# Patient Record
Sex: FEMALE | Race: WHITE | NOT HISPANIC OR LATINO | Employment: FULL TIME | URBAN - METROPOLITAN AREA
[De-identification: names, ages, dates, MRNs, and addresses within clinical notes are randomized per-mention and may not be internally consistent; named-entity substitution may affect disease eponyms.]

---

## 2017-01-10 ENCOUNTER — ALLSCRIPTS OFFICE VISIT (OUTPATIENT)
Dept: OTHER | Facility: OTHER | Age: 32
End: 2017-01-10

## 2017-04-03 ENCOUNTER — ALLSCRIPTS OFFICE VISIT (OUTPATIENT)
Dept: OTHER | Facility: OTHER | Age: 32
End: 2017-04-03

## 2017-04-05 ENCOUNTER — ALLSCRIPTS OFFICE VISIT (OUTPATIENT)
Dept: OTHER | Facility: OTHER | Age: 32
End: 2017-04-05

## 2017-04-11 ENCOUNTER — LAB CONVERSION - ENCOUNTER (OUTPATIENT)
Dept: OTHER | Facility: OTHER | Age: 32
End: 2017-04-11

## 2017-04-11 LAB
ADDITIONAL INFORMATION (HISTORICAL): NORMAL
ADEQUACY: (HISTORICAL): NORMAL
COMMENT (HISTORICAL): NORMAL
CYTOTECHNOLOGIST: (HISTORICAL): NORMAL
HPV MRNA E6/E7 (HISTORICAL): NOT DETECTED
INTERPRETATION (HISTORICAL): NORMAL
LMP (HISTORICAL): NORMAL
PREV. BX: (HISTORICAL): NORMAL
PREV. PAP (HISTORICAL): NORMAL
REVIEWED BY (HISTORICAL): NORMAL
SOURCE (HISTORICAL): NORMAL

## 2017-04-12 ENCOUNTER — LAB CONVERSION - ENCOUNTER (OUTPATIENT)
Dept: OTHER | Facility: OTHER | Age: 32
End: 2017-04-12

## 2017-05-12 DIAGNOSIS — E04.1 NONTOXIC SINGLE THYROID NODULE: ICD-10-CM
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
## 2017-05-16 ENCOUNTER — LAB CONVERSION - ENCOUNTER (OUTPATIENT)
Dept: OTHER | Facility: OTHER | Age: 32
End: 2017-05-16

## 2017-05-16 ENCOUNTER — GENERIC CONVERSION - ENCOUNTER (OUTPATIENT)
Dept: OTHER | Facility: OTHER | Age: 32
End: 2017-05-16

## 2017-05-16 LAB — LYME IGG/IGM AB (HISTORICAL): <0.9 INDEX

## 2017-05-18 ENCOUNTER — ALLSCRIPTS OFFICE VISIT (OUTPATIENT)
Dept: OTHER | Facility: OTHER | Age: 32
End: 2017-05-18

## 2017-05-19 ENCOUNTER — HOSPITAL ENCOUNTER (OUTPATIENT)
Dept: RADIOLOGY | Facility: HOSPITAL | Age: 32
Discharge: HOME | End: 2017-05-19
Payer: COMMERCIAL

## 2017-05-19 DIAGNOSIS — E04.1 NONTOXIC SINGLE THYROID NODULE: ICD-10-CM

## 2017-05-19 PROCEDURE — 76536 US EXAM OF HEAD AND NECK: CPT

## 2017-05-20 ENCOUNTER — GENERIC CONVERSION - ENCOUNTER (OUTPATIENT)
Dept: OTHER | Facility: OTHER | Age: 32
End: 2017-05-20

## 2017-05-23 LAB
T4 FREE SERPL-MCNC: 1.5 NG/DL
TSH SERPL DL<=0.05 MIU/L-ACNC: 2.08 M[IU]/L

## 2018-01-12 VITALS
HEART RATE: 84 BPM | WEIGHT: 131.2 LBS | DIASTOLIC BLOOD PRESSURE: 64 MMHG | BODY MASS INDEX: 25.76 KG/M2 | HEIGHT: 60 IN | RESPIRATION RATE: 16 BRPM | TEMPERATURE: 97.9 F | SYSTOLIC BLOOD PRESSURE: 110 MMHG

## 2018-01-13 VITALS
WEIGHT: 128.5 LBS | HEIGHT: 60 IN | RESPIRATION RATE: 18 BRPM | DIASTOLIC BLOOD PRESSURE: 68 MMHG | TEMPERATURE: 99.2 F | SYSTOLIC BLOOD PRESSURE: 102 MMHG | HEART RATE: 72 BPM | BODY MASS INDEX: 25.23 KG/M2

## 2018-01-13 VITALS
WEIGHT: 126.8 LBS | HEIGHT: 60 IN | RESPIRATION RATE: 16 BRPM | TEMPERATURE: 97.2 F | HEART RATE: 60 BPM | BODY MASS INDEX: 24.9 KG/M2 | DIASTOLIC BLOOD PRESSURE: 80 MMHG | SYSTOLIC BLOOD PRESSURE: 106 MMHG

## 2018-01-14 VITALS
HEIGHT: 60 IN | SYSTOLIC BLOOD PRESSURE: 122 MMHG | BODY MASS INDEX: 25.82 KG/M2 | WEIGHT: 131.5 LBS | DIASTOLIC BLOOD PRESSURE: 80 MMHG

## 2018-01-16 NOTE — MISCELLANEOUS
Message   Date: 17 Aug 2016 8:25 AM EST, Recorded By: Andreia Vazquez For: Dante Baxter   Caller: Finas Habermann, Self   Phone: [phone] (Home), ,   Reason: Medical Complaint   Patient called and stated she was only about 5wks pregnant  Last night she started spotting blood and feels like she is going to have her period  Her OBGYN does not open office till 1pm  I advised to call their on call physician and go to ER to be further evaluated  Patient agreed and understood  Active Problems    1  Anxiety (300 00) (F41 9)   2  Asthma (493 90) (J45 909)   3  Bicornate uterus (752 34) (Q51 3)   4  Dermatitis (692 9) (L30 9)   5  GERD (gastroesophageal reflux disease) (530 81) (K21 9)   6  History of allergy (V15 09) (Z88 9)   7  Irritable bowel syndrome (564 1) (K58 9)   8  Need for prophylactic vaccination and inoculation against influenza (V04 81) (Z23)   9  Pain in joint of right shoulder region (719 41) (M25 511)   10  Tear of acetabular labrum (843 8) (S73 199A)   11  Viral infection (079 99) (B34 9)    Current Meds   1  Azithromycin 250 MG Oral Tablet; TAKE 2 TABLETS ON DAY 1 THEN TAKE 1 TABLET A   DAY FOR 4 DAYS; Therapy: 11Wnn6816 to (Last Rx:13Mhr5005) Ordered   2  Estrostep Fe 1-20/1-30/1-35 MG-MCG Oral Tablet; TAKE 1 TABLET DAILY FOR 21 DAYS,   THEN 7 TABLET-FREE DAYS; REPEAT Recorded   3  Nasonex 50 MCG/ACT Nasal Suspension; USE 1 TO 2 SPRAYS IN EACH NOSTRIL   ONCE DAILY; Therapy: 65PCO1048 to (Evaluate:62Cjg2344)  Requested for: 24SYU7459; Last   Rx:37Gyz9588 Ordered   4  Pantoprazole Sodium 40 MG Oral Tablet Delayed Release; TAKE 1 TABLET BY MOUTH   DAILY 30 MINUTES BEFORE BREAKFAST AND REPEAT AT DINNER IF SYMPTOMS   RECUR; Therapy: 26VAN5295 to (Evaluate:21Jan2016)  Requested for: 54Jcn5876; Last   Rx:[id number] Ordered   5  Vitamin D 1000 UNIT CAPS; Take as directed Recorded   6  ZyrTEC Allergy TABS; Therapy: (Recorded:22Mar2013) to Recorded    Allergies    1   Flagyl CAPS   2  Flagyl TABS    Plan  Pregnancy    · (1) ABO/RH(D); Status:Active - Retrospective Authorization; Requested for:63Exm9845;    · (1) HCG QUANT; Status:Active - Retrospective Authorization; Requested for:82Ram4286;    · (1) HCG QUANT; Status:Active - Retrospective Authorization; Requested for:79Dyd4339;     Signatures   Electronically signed by : Chandrakant Manley, ; Aug 17 2016  8:27AM EST                       (Author)    Electronically signed by :  Saundra Ellsworth DO; Aug 17 2016  3:41PM EST                       (Author)

## 2018-01-17 NOTE — RESULT NOTES
Verified Results  (Q) LYME DISEASE AB, TOTAL W/REFL WB (IGG, IGM) [id number] 09:10AM Crystal Esther   REPORT COMMENT:  FASTING:NO     Test Name Result Flag Reference   LYME AB SCREEN <0 90 index     Index                Interpretation                     -----                --------------                     < 0 90               Negative                     0  90-1 09            Equivocal                     > 1 09               Positive      As recommended by the Food and Drug Administration   (FDA), all samples with positive or equivocal   results in a Borrelia burgdorferi antibody screen  will be tested using a blot method  Positive or   equivocal screening test results should not be   interpreted as truly positive until verified as such   using a supplemental assay (e g , B  burgdorferi blot)  The screening test and/or blot for B  burgdorferi   antibodies may be falsely negative in early stages  of Lyme disease, including the period when erythema   migrans is apparent

## 2018-01-25 ENCOUNTER — INITIAL PRENATAL (OUTPATIENT)
Dept: OBGYN CLINIC | Facility: CLINIC | Age: 33
End: 2018-01-25

## 2018-01-25 VITALS — SYSTOLIC BLOOD PRESSURE: 112 MMHG | DIASTOLIC BLOOD PRESSURE: 62 MMHG | WEIGHT: 125 LBS | BODY MASS INDEX: 24.41 KG/M2

## 2018-01-25 DIAGNOSIS — Z3A.10 10 WEEKS GESTATION OF PREGNANCY: ICD-10-CM

## 2018-01-25 DIAGNOSIS — Z36.9 ANTENATAL SCREENING ENCOUNTER: Primary | ICD-10-CM

## 2018-01-25 PROCEDURE — PNV: Performed by: OBSTETRICS & GYNECOLOGY

## 2018-01-25 RX ORDER — ESTRADIOL 2 MG/1
2 TABLET ORAL 2 TIMES DAILY
COMMUNITY
End: 2018-07-13 | Stop reason: ALTCHOICE

## 2018-01-25 RX ORDER — ASPIRIN 81 MG/1
TABLET, CHEWABLE ORAL
COMMUNITY
End: 2018-08-13 | Stop reason: HOSPADM

## 2018-01-25 RX ORDER — ALBUTEROL SULFATE 90 UG/1
AEROSOL, METERED RESPIRATORY (INHALATION)
COMMUNITY
Start: 2012-01-05 | End: 2018-06-05 | Stop reason: SDUPTHER

## 2018-01-25 RX ORDER — LEVOTHYROXINE SODIUM 0.07 MG/1
1 TABLET ORAL DAILY
COMMUNITY
End: 2018-04-06

## 2018-01-25 NOTE — PROGRESS NOTES
Assessment     Pregnancy at  10 and 1/7 weeks      Plan    Prenatal vitamins  AFP3 discussed: undecided  Role of ultrasound in pregnancy discussed; fetal survey: discussed  Amniocentesis discussed: not indicated  Follow up in 1 weeks  100% of 90 min visit spent on counseling and coordination of care  Subjective     Rosalba Koenig is being seen today for her first obstetrical visit  This is a planned pregnancy  She is at 10 1/7 gestation  Her obstetrical history is significant for infertility, IVF pregnancy, 2 cervi  Relationship with FOB: spouse, living together  Patient does intend to breast feed  Pregnancy history fully reviewed  Menstrual History:  OB History      Para Term  AB Living    4       3 0    SAB TAB Ectopic Multiple Live Births    3       0        Obstetric Comments    [facility]          The following portions of the patient's history were reviewed and updated as appropriate: allergies, current medications, past family history, past medical history, past social history and past surgical history  Review of Systems  Pertinent items are noted in HPI  Objective     No exam performed today, OB physical 18  Pt did three rounds of IVF to achieve this pregnancy  Pt seen by Dr Nely Reyes at [address]  Pt did complete carrier screening and cytogenetics prior to implantation  Pt is taking OTC PNV with DHA and folic acid  Initial labs ordered (quest) +1hr glucose (mother has DM I, and so was MGM)  Prenatal packet given and discussed, all questions and concerns addressed  Pt verbalized understanding to teaching

## 2018-01-29 ENCOUNTER — APPOINTMENT (OUTPATIENT)
Dept: LAB | Facility: HOSPITAL | Age: 33
End: 2018-01-29
Attending: OBSTETRICS & GYNECOLOGY
Payer: COMMERCIAL

## 2018-01-29 ENCOUNTER — TRANSCRIBE ORDERS (OUTPATIENT)
Dept: LAB | Facility: HOSPITAL | Age: 33
End: 2018-01-29

## 2018-01-29 DIAGNOSIS — O24.419 GESTATIONAL DIABETES MELLITUS (GDM) IN FIRST TRIMESTER, GESTATIONAL DIABETES METHOD OF CONTROL UNSPECIFIED: Primary | ICD-10-CM

## 2018-01-29 DIAGNOSIS — Z36.9 RESEARCH REQUESTED ANTENATAL ULTRASOUND SCAN: Primary | ICD-10-CM

## 2018-01-29 DIAGNOSIS — Z36.9 RESEARCH REQUESTED ANTENATAL ULTRASOUND SCAN: ICD-10-CM

## 2018-01-29 LAB
ABO GROUP BLD: NORMAL
BASOPHILS # BLD AUTO: 0.03 THOUSANDS/ΜL (ref 0–0.1)
BASOPHILS NFR BLD AUTO: 1 % (ref 0–1)
BILIRUB UR QL STRIP: NEGATIVE
BLD GP AB SCN SERPL QL: NEGATIVE
CLARITY UR: ABNORMAL
COLOR UR: YELLOW
EOSINOPHIL # BLD AUTO: 0.14 THOUSAND/ΜL (ref 0–0.61)
EOSINOPHIL NFR BLD AUTO: 2 % (ref 0–6)
ERYTHROCYTE [DISTWIDTH] IN BLOOD BY AUTOMATED COUNT: 11.9 % (ref 11.6–15.1)
GLUCOSE 1H P 50 G GLC PO SERPL-MCNC: 189 MG/DL
GLUCOSE UR STRIP-MCNC: ABNORMAL MG/DL
HCT VFR BLD AUTO: 35.5 % (ref 34.8–46.1)
HGB BLD-MCNC: 12.4 G/DL (ref 11.5–15.4)
HGB UR QL STRIP.AUTO: NEGATIVE
HIV 1+2 AB+HIV1 P24 AG SERPL QL IA: NORMAL
HIV1 P24 AG SER QL: NORMAL
KETONES UR STRIP-MCNC: NEGATIVE MG/DL
LEUKOCYTE ESTERASE UR QL STRIP: NEGATIVE
LYMPHOCYTES # BLD AUTO: 1.56 THOUSANDS/ΜL (ref 0.6–4.47)
LYMPHOCYTES NFR BLD AUTO: 25 % (ref 14–44)
MCH RBC QN AUTO: 31.6 PG (ref 26.8–34.3)
MCHC RBC AUTO-ENTMCNC: 34.9 G/DL (ref 31.4–37.4)
MCV RBC AUTO: 91 FL (ref 82–98)
MONOCYTES # BLD AUTO: 0.33 THOUSAND/ΜL (ref 0.17–1.22)
MONOCYTES NFR BLD AUTO: 5 % (ref 4–12)
NEUTROPHILS # BLD AUTO: 4.09 THOUSANDS/ΜL (ref 1.85–7.62)
NEUTS SEG NFR BLD AUTO: 67 % (ref 43–75)
NITRITE UR QL STRIP: NEGATIVE
NRBC BLD AUTO-RTO: 0 /100 WBCS
PH UR STRIP.AUTO: 6 [PH] (ref 4.5–8)
PLATELET # BLD AUTO: 280 THOUSANDS/UL (ref 149–390)
PMV BLD AUTO: 8.7 FL (ref 8.9–12.7)
PROT UR STRIP-MCNC: NEGATIVE MG/DL
RBC # BLD AUTO: 3.92 MILLION/UL (ref 3.81–5.12)
RH BLD: NEGATIVE
RPR SER QL: NORMAL
RUBV IGG SERPL IA-ACNC: 39.1 IU/ML
SP GR UR STRIP.AUTO: 1.03 (ref 1–1.03)
SPECIMEN EXPIRATION DATE: NORMAL
UROBILINOGEN UR QL STRIP.AUTO: 0.2 E.U./DL
WBC # BLD AUTO: 6.18 THOUSAND/UL (ref 4.31–10.16)

## 2018-01-29 PROCEDURE — 81003 URINALYSIS AUTO W/O SCOPE: CPT

## 2018-01-29 PROCEDURE — 86706 HEP B SURFACE ANTIBODY: CPT

## 2018-01-29 PROCEDURE — 82950 GLUCOSE TEST: CPT

## 2018-01-29 PROCEDURE — 36415 COLL VENOUS BLD VENIPUNCTURE: CPT

## 2018-01-29 PROCEDURE — 80081 OBSTETRIC PANEL INC HIV TSTG: CPT

## 2018-01-29 PROCEDURE — 87806 HIV AG W/HIV1&2 ANTB W/OPTIC: CPT

## 2018-01-30 ENCOUNTER — OFFICE VISIT (OUTPATIENT)
Dept: OBGYN CLINIC | Facility: CLINIC | Age: 33
End: 2018-01-30

## 2018-01-30 VITALS
SYSTOLIC BLOOD PRESSURE: 118 MMHG | BODY MASS INDEX: 24.46 KG/M2 | DIASTOLIC BLOOD PRESSURE: 78 MMHG | HEIGHT: 60 IN | WEIGHT: 124.6 LBS

## 2018-01-30 DIAGNOSIS — Z3A.10 10 WEEKS GESTATION OF PREGNANCY: ICD-10-CM

## 2018-01-30 DIAGNOSIS — Z36.9 ANTENATAL SCREENING ENCOUNTER: Primary | ICD-10-CM

## 2018-01-30 LAB
HBV SURFACE AB SER-ACNC: >1000 MIU/ML
HBV SURFACE AG SER QL: NORMAL

## 2018-01-30 PROCEDURE — PNV: Performed by: OBSTETRICS & GYNECOLOGY

## 2018-01-31 PROBLEM — Z87.718 HISTORY OF UTERINE ANOMALY: Status: ACTIVE | Noted: 2018-01-31

## 2018-01-31 LAB — HIV 1+2 AB+HIV1 P24 AG SERPL QL IA: NORMAL

## 2018-01-31 NOTE — PROGRESS NOTES
This is a 68-year-old white female she is a  4 para 0 she has a history of a complete uterine septum all the way down to the external cervical os  This was removed surgically  She has been seeing an infertility specialist at [address] [facility]  She conceived via IVF pregnancy, with frequent ultrasound dating given an estimated date of confinement of 2018  She is currently taking vitamin-D, Synthroid, for hypothyroidism  She is also taking a prenatal vitamin and baby aspirin  She has also been started on progesterone suppositories as per her infertility specialist she will stop that at approximately 13 weeks  Her blood type is Rh negative  Under chromosome study of the embryo is supposed to be 55 XX  Hepatitis screening test was negative  The patient also has a history of will double cervix  She also has a history of abnormal Pap 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  No treatment has been performed  Physical exam shows a well-developed well-nourished white female in no acute distress, her cardiac exam was normal with a normal S1-S2 with no murmur  Her lung exam normal limits breast exam normal limits is no masses  Her abdominal exam   is benign Pelvic exam the external genitalia normal limits the vagina is clean  Visualization of the cervix with a double cervix still  A Pap smear was performed with each the cervix     The patient is aware of resolved  She has a double cervix      Impression    Intrauterine pregnancy, size equals dates, [facility] 2018, history of a uterine septum that is removed surgically  IV of pregnancy  I suggests evaluation at [place] [address]  Will continue to follow her thyroid levels  She will continue the progesterone suppositories to approximately 13 weeks gestation  She is aware of increased risk for  section

## 2018-01-31 NOTE — PATIENT INSTRUCTIONS
Intrauterine pregnancy size equals dates IV of pregnancy history of hypothyroidism prior uterine surgery suggest Maternal-Fetal Medicine consultation

## 2018-02-05 LAB
C TRACH RRNA SPEC QL NAA+PROBE: NOT DETECTED
C TRACH RRNA SPEC QL NAA+PROBE: NOT DETECTED
CLINICAL INFO: NORMAL
CLINICAL INFO: NORMAL
CYTO CVX: NORMAL
CYTO CVX: NORMAL
CYTOLOGY CMNT CVX/VAG CYTO-IMP: NORMAL
CYTOLOGY CMNT CVX/VAG CYTO-IMP: NORMAL
DATE PREVIOUS BX: NORMAL
DATE PREVIOUS BX: NORMAL
HPV E6+E7 MRNA CVX QL NAA+PROBE: NOT DETECTED
HPV E6+E7 MRNA CVX QL NAA+PROBE: NOT DETECTED
LMP START DATE: NORMAL
LMP START DATE: NORMAL
N GONORRHOEA RRNA SPEC QL NAA+PROBE: NOT DETECTED
N GONORRHOEA RRNA SPEC QL NAA+PROBE: NOT DETECTED
SL AMB PREV. PAP:: NORMAL
SL AMB PREV. PAP:: NORMAL
SPECIMEN SOURCE CVX/VAG CYTO: NORMAL
SPECIMEN SOURCE CVX/VAG CYTO: NORMAL

## 2018-02-07 ENCOUNTER — ROUTINE PRENATAL (OUTPATIENT)
Dept: PERINATAL CARE | Facility: CLINIC | Age: 33
End: 2018-02-07
Payer: COMMERCIAL

## 2018-02-07 ENCOUNTER — OFFICE VISIT (OUTPATIENT)
Dept: PERINATAL CARE | Facility: CLINIC | Age: 33
End: 2018-02-07
Payer: COMMERCIAL

## 2018-02-07 ENCOUNTER — LAB (OUTPATIENT)
Dept: LAB | Facility: HOSPITAL | Age: 33
End: 2018-02-07
Payer: COMMERCIAL

## 2018-02-07 VITALS
HEART RATE: 80 BPM | WEIGHT: 124.2 LBS | DIASTOLIC BLOOD PRESSURE: 63 MMHG | HEIGHT: 60 IN | BODY MASS INDEX: 24.39 KG/M2 | SYSTOLIC BLOOD PRESSURE: 117 MMHG

## 2018-02-07 DIAGNOSIS — Z3A.12 12 WEEKS GESTATION OF PREGNANCY: ICD-10-CM

## 2018-02-07 DIAGNOSIS — O24.419 GESTATIONAL DIABETES MELLITUS (GDM) IN FIRST TRIMESTER, GESTATIONAL DIABETES METHOD OF CONTROL UNSPECIFIED: ICD-10-CM

## 2018-02-07 DIAGNOSIS — Q51.28 PREGNANCY WITH CONGENITAL DUPLICATION OF UTERUS IN FIRST TRIMESTER: ICD-10-CM

## 2018-02-07 DIAGNOSIS — E03.9 HYPOTHYROIDISM, UNSPECIFIED TYPE: ICD-10-CM

## 2018-02-07 DIAGNOSIS — O24.410 DIET CONTROLLED GESTATIONAL DIABETES MELLITUS (GDM) IN FIRST TRIMESTER: Primary | ICD-10-CM

## 2018-02-07 DIAGNOSIS — O24.419 GESTATIONAL DIABETES MELLITUS (GDM) IN FIRST TRIMESTER, GESTATIONAL DIABETES METHOD OF CONTROL UNSPECIFIED: Primary | ICD-10-CM

## 2018-02-07 DIAGNOSIS — O34.591 PREGNANCY WITH CONGENITAL DUPLICATION OF UTERUS IN FIRST TRIMESTER: ICD-10-CM

## 2018-02-07 DIAGNOSIS — O09.811 PREGNANCY RESULTING FROM IN VITRO FERTILIZATION IN FIRST TRIMESTER: ICD-10-CM

## 2018-02-07 DIAGNOSIS — Z36.82 ENCOUNTER FOR NUCHAL TRANSLUCENCY TESTING: Primary | ICD-10-CM

## 2018-02-07 LAB
ALBUMIN SERPL BCP-MCNC: 3.3 G/DL (ref 3.5–5)
ALP SERPL-CCNC: 39 U/L (ref 46–116)
ALT SERPL W P-5'-P-CCNC: 18 U/L (ref 12–78)
ANION GAP SERPL CALCULATED.3IONS-SCNC: 10 MMOL/L (ref 4–13)
AST SERPL W P-5'-P-CCNC: 12 U/L (ref 5–45)
BILIRUB SERPL-MCNC: 0.3 MG/DL (ref 0.2–1)
BUN SERPL-MCNC: 9 MG/DL (ref 5–25)
CALCIUM SERPL-MCNC: 8.7 MG/DL (ref 8.3–10.1)
CHLORIDE SERPL-SCNC: 102 MMOL/L (ref 100–108)
CO2 SERPL-SCNC: 27 MMOL/L (ref 21–32)
CREAT SERPL-MCNC: 0.47 MG/DL (ref 0.6–1.3)
EST. AVERAGE GLUCOSE BLD GHB EST-MCNC: 91 MG/DL
GFR SERPL CREATININE-BSD FRML MDRD: 131 ML/MIN/1.73SQ M
GLUCOSE P FAST SERPL-MCNC: 98 MG/DL (ref 65–99)
HBA1C MFR BLD: 4.8 % (ref 4.2–6.3)
POTASSIUM SERPL-SCNC: 3.9 MMOL/L (ref 3.5–5.3)
PROT SERPL-MCNC: 6.9 G/DL (ref 6.4–8.2)
SODIUM SERPL-SCNC: 139 MMOL/L (ref 136–145)
T4 FREE SERPL-MCNC: 1.29 NG/DL (ref 0.76–1.46)
TSH SERPL DL<=0.05 MIU/L-ACNC: 0.9 UIU/ML (ref 0.36–3.74)

## 2018-02-07 PROCEDURE — 99242 OFF/OP CONSLTJ NEW/EST SF 20: CPT | Performed by: OBSTETRICS & GYNECOLOGY

## 2018-02-07 PROCEDURE — 76801 OB US < 14 WKS SINGLE FETUS: CPT | Performed by: OBSTETRICS & GYNECOLOGY

## 2018-02-07 PROCEDURE — 84439 ASSAY OF FREE THYROXINE: CPT

## 2018-02-07 PROCEDURE — 83036 HEMOGLOBIN GLYCOSYLATED A1C: CPT

## 2018-02-07 PROCEDURE — 36415 COLL VENOUS BLD VENIPUNCTURE: CPT

## 2018-02-07 PROCEDURE — 76813 OB US NUCHAL MEAS 1 GEST: CPT | Performed by: OBSTETRICS & GYNECOLOGY

## 2018-02-07 PROCEDURE — 99215 OFFICE O/P EST HI 40 MIN: CPT | Performed by: NURSE PRACTITIONER

## 2018-02-07 PROCEDURE — 84443 ASSAY THYROID STIM HORMONE: CPT

## 2018-02-07 PROCEDURE — G0108 DIAB MANAGE TRN  PER INDIV: HCPCS | Performed by: DIETITIAN, REGISTERED

## 2018-02-07 PROCEDURE — 80053 COMPREHEN METABOLIC PANEL: CPT

## 2018-02-07 RX ORDER — LANCETS
EACH MISCELLANEOUS
Qty: 102 EACH | Refills: 2 | Status: SHIPPED | OUTPATIENT
Start: 2018-02-07 | End: 2018-05-11 | Stop reason: SDUPTHER

## 2018-02-07 NOTE — PROGRESS NOTES
I have reviewed the notes, assessments, and/or procedures performed by Christine Nettles, I concur with her/his documentation of Markel Phillips

## 2018-02-07 NOTE — PROGRESS NOTES
The patient was seen today for an ultrasound and consultation  Please see ultrasound report for details

## 2018-02-07 NOTE — PROGRESS NOTES
Assessment/Plan:   Diagnoses and all orders for this visit:    Gestational diabetes mellitus (GDM) in first trimester, gestational diabetes method of control unspecified  -     Ambulatory referral to Diabetic Education  -     Hemoglobin A1c; Future  -     Comprehensive metabolic panel; Future  -     TSH, 3rd generation; Future  -     T4, free; Future  -     glucose blood (ACCU-CHEK GUIDE) test strip; Use as instructed  -     ACCU-CHEK FASTCLIX LANCETS MISC; Use 4 a day    Hypothyroidism, unspecified type  -     Hemoglobin A1c; Future  -     Comprehensive metabolic panel; Future  -     TSH, 3rd generation; Future  -     T4, free; Future    12 weeks gestation of pregnancy  -     Hemoglobin A1c; Future  -     Comprehensive metabolic panel; Future  -     TSH, 3rd generation; Future  -     T4, free; Future  -     glucose blood (ACCU-CHEK GUIDE) test strip; Use as instructed  -     ACCU-CHEK FASTCLIX LANCETS MISC; Use 4 a day        Subjective:      Patient ID: Olivia Patterson is a 28 y o  female  She is a , IVF pregnancy with double uterus, followed by Raghu Barbosa Specialist  LMP in November, JACKIE 18, currently 12 0/7 weeks gestation  Patient referred here for gestational diabetes, 1 hour glucose test 189, reports checking her blood sugars 2 hours after meals, one episode of 190, readings mostly between 80 to 90  She has gluten intolerance  Has been following a diet of 3 meals with 2 snacks  Goes to yoga once a week  C/o thirst at night, denies increase in hunger or urination  [facility]- mother hast type 1 diabetes and hypothyroidism  Has Hypothyroidism, currently on Levothyroxine 75 mcg daily for about 1 year, no recent TSH/free T4  Complains of fatigue, nausea at night subsided, c/o cold feet, denies constipation or swelling        The following portions of the patient's history were reviewed and updated as appropriate: allergies, current medications, past family history, past medical history and problem list     Review of Systems   Constitutional: Positive for fatigue  Negative for appetite change  Respiratory: Negative for apnea, cough and wheezing  Cardiovascular: Negative for chest pain, palpitations and leg swelling  Gastrointestinal: Negative for constipation, nausea and vomiting  Endocrine: Positive for cold intolerance and polydipsia  Negative for heat intolerance, polyphagia and polyuria  Objective:     Physical Exam   Constitutional: She is oriented to person, place, and time  She appears well-developed and well-nourished  No distress  HENT:   Head: Normocephalic  Neck: Normal range of motion  Neck supple  No thyromegaly present  Cardiovascular: Normal rate, regular rhythm and normal heart sounds  No murmur heard  Pulmonary/Chest: No respiratory distress  She has no wheezes  Musculoskeletal: Normal range of motion  She exhibits no edema  Neurological: She is alert and oriented to person, place, and time  Skin: Skin is warm and dry  She is not diaphoretic  Psychiatric: She has a normal mood and affect   Her behavior is normal

## 2018-02-07 NOTE — PROGRESS NOTES
PREGNANCY DSMT/MNT     Assessment    Estimated Date of Delivery: 8/22/18   EGA: 12 0/7 weeks     HPI:    Patient presents to the office today for: Individual DSMT     Pregnancy is complicated by the following:  gestational diabetes     Barriers to Learning:  none     Height: 60 inches   Pre-Pregnancy weight: 122 pounds            BMI: 23 8   Current Weight:124 pounds                        Total Pregnancy Weight Gain: 2 pounds   One Hour Glucola: date completed 1/29/2018, 189 mg/dL            Medical Nutrition Therapy  1  Based on the patients height and pre-pregnancy weight, the recommended total weight gain for pregnancy is 25-35 pounds  2  The patient was provided with a 1500 calories for 1st trimester & 1700 calories for 2nd/3rd trimesters gestational diabetes meal plan  Stated she has a gluten intolerance & used many Gluten Free foods  3  Basic review of macronutrients  4  Consume three meals and three snacks daily  5  Carbohydrate gram limits per meal   6  Instructions on how to read a food label  Explained how to read food labels for CHO gm willy with Gluten Free foods  7  Appropriate serving sizes for carbohydrates and proteins  8  Incorporating protein with each meal and snack  9  Importance of protein in relationship to blood glucose control  10  Maintain a 3 day food log and  LIST VALUES (choose one) being to follow up appointment with the registered dietician  11  Reviewed patients food diary  12  Listeria guidelines  13  Mercury guidelines  Hypoglycemia Guidelines  Sick Day Guidelines  Exercise Guidelines  1  Exercise 1-2 hours follow a meal for approximately 20-30 minutes  2  Do not exercise until cleared by primary obstetrician  Travel Guidelines  Dining Out Guidelines  Breastfeeding Guidelines  Post Partum Guidelines:   1  Completion of a 2 hour, 75 gram glucose tolerance test at 6 weeks post partum to screen for type 2 diabetes  2  Screen for diabetes annually    3  20% weight loss and 30 minutes of exercise per week reduces the risk of type 2 diabetes  4  Screen for gestational diabetes in first trimester with any subsequent pregnancies  Maternal-Fetal Testing  1  Ultrasounds every 4 weeks at the [facility] office  2  Level II ultrasound at 20 weeks gestation  3  Fetal echocardiogram at 24 weeks for Perinatologist recommendation, if needed  4  Antepartum testing  beginning at 32 weeks gestation  twice weekly, if begins medication  5  HbA1c every 6 to 8 weeks  6  Comprehensive Metabolic Panel  Follow up  1  Contact the Diabetes and Pregnancy program with fingerstick blood glucose levels on Monday, 2/12/2018  Phone: [phone]  Fax: [phone]  Email: [email or web address]  2  Patient instructed to call [phone] if she does not receive a response within one business day  3  Follow up appointment to be scheduled as needed  Thank you for the opportunity to participate in the care of this patient  We can be reached at [phone] should you have any questions  Time spent with patient 10 to 11 AM; time spent counseling greater than 50% of the appointment

## 2018-02-07 NOTE — PATIENT INSTRUCTIONS
1  Start diet recommended by dietician with 3 meals and 3 snacks including protein  2  Check blood sugar in am, before meals, 2 hours after start of meal and at bedtime until Monday, 18 and send in glucose log   3  Fasting goals 60 to 90, 1 hour post meals 135 or less and 2 hour after meals 120 or less  4  Keep  center [address] scheduled  5  At 20 weeks you will need a Level II Us  6  Check A1c, CMP, TSH and free T4   7  Continue Levothyroxine 75 mcg daily in am, after review of TSH will assess need for adjustment  8  Continue baby aspirin and prenatal vitamin  9  Hypoglycemia and gestational diabetes reviewed, information given  10  Continue follow-up with OB and  center

## 2018-02-20 ENCOUNTER — TELEPHONE (OUTPATIENT)
Dept: PERINATAL CARE | Facility: CLINIC | Age: 33
End: 2018-02-20

## 2018-02-20 DIAGNOSIS — O34.591 PREGNANCY WITH CONGENITAL DUPLICATION OF UTERUS IN FIRST TRIMESTER: ICD-10-CM

## 2018-02-20 DIAGNOSIS — Q51.28 PREGNANCY WITH CONGENITAL DUPLICATION OF UTERUS IN FIRST TRIMESTER: ICD-10-CM

## 2018-02-23 ENCOUNTER — ROUTINE PRENATAL (OUTPATIENT)
Dept: OBGYN CLINIC | Facility: CLINIC | Age: 33
End: 2018-02-23

## 2018-02-23 VITALS — DIASTOLIC BLOOD PRESSURE: 60 MMHG | WEIGHT: 127.2 LBS | BODY MASS INDEX: 24.84 KG/M2 | SYSTOLIC BLOOD PRESSURE: 100 MMHG

## 2018-02-23 DIAGNOSIS — Z34.82 PRENATAL CARE, SUBSEQUENT PREGNANCY, SECOND TRIMESTER: Primary | ICD-10-CM

## 2018-02-23 PROCEDURE — PNV: Performed by: OBSTETRICS & GYNECOLOGY

## 2018-02-23 NOTE — PROGRESS NOTES
Patient is experiencing mild nausea and dull HA daily  She is using tylenol  She has dimmed the lights in her work place and is using a diffuser which helps  She completed the first part of the SQS testing on 2/27/18  Nuchal translucency is normal, nasal bone present  Follow up in 4 weeks

## 2018-03-13 ENCOUNTER — TELEPHONE (OUTPATIENT)
Dept: PERINATAL CARE | Facility: CLINIC | Age: 33
End: 2018-03-13

## 2018-03-13 NOTE — TELEPHONE ENCOUNTER
Date:  18  RE: Ronak Varela    : 1985  JACKIE: Estimated Date of Delivery: 18  EGA: 16w6d      Date Fasting Post-  breakfast Post-  lunch Post-  dinner Before bedtime Carbs Comments   3-7 81 88 103 108      3-8 83 96 73 103      3-9 92 98 116 113      3-10 91 87 95 112      3-11 86 81 97 109      3-12 86 98 99 107      3-13 98 101 99           Current regimen:  1500 calorie gestational diabetes meal plan with 3 meals/snacks  Self blood glucose monitoring fasting and 2 hours after meals  Plan:  Continue above regimen      Date due to report next:  Tuesday, 3-20-18    Don Alvarenga RN  Diabetes Educator   Diabetes and Pregnancy Program

## 2018-03-20 ENCOUNTER — DOCUMENTATION (OUTPATIENT)
Dept: PERINATAL CARE | Facility: CLINIC | Age: 33
End: 2018-03-20

## 2018-03-20 NOTE — PROGRESS NOTES
Date:  18  RE: Amanda Fraction    : 1985  JACKIE: Estimated Date of Delivery: 18  EGA: 17w6d      Date Fasting Post-  breakfast Post-  lunch Post-  dinner Before bedtime Carbs Comments   3/14 84 88 100 123      3/15 88 90 101 117      3/16 91 89 125 119      3/17 88 91 87 97      3/18 85 89 120 100      3/19 88 90 74 106      3/20 90 90 86           Current regimen:  1500 calorie gestational diabetes meal plan with 3 meals/snacks, bedtime snack protein only 2 to 3 oz  Self blood glucose monitoring fasting and 2 hours after meals  Plan:  Continue above regimen  Date due to report next:  Tuesday, 3-27-18 or sooner if blood sugars consistently out of goal range       MARIYA Garner  Diabetes and Pregnancy Program

## 2018-03-22 ENCOUNTER — TRANSCRIBE ORDERS (OUTPATIENT)
Dept: LAB | Facility: HOSPITAL | Age: 33
End: 2018-03-22

## 2018-03-22 ENCOUNTER — LAB (OUTPATIENT)
Dept: LAB | Facility: HOSPITAL | Age: 33
End: 2018-03-22
Attending: OBSTETRICS & GYNECOLOGY
Payer: COMMERCIAL

## 2018-03-22 ENCOUNTER — ROUTINE PRENATAL (OUTPATIENT)
Dept: OBGYN CLINIC | Facility: CLINIC | Age: 33
End: 2018-03-22

## 2018-03-22 VITALS — DIASTOLIC BLOOD PRESSURE: 62 MMHG | SYSTOLIC BLOOD PRESSURE: 104 MMHG | WEIGHT: 128 LBS | BODY MASS INDEX: 25 KG/M2

## 2018-03-22 DIAGNOSIS — Z34.90 PREGNANCY, UNSPECIFIED GESTATIONAL AGE: Primary | ICD-10-CM

## 2018-03-22 DIAGNOSIS — Z34.82 PRENATAL CARE, SUBSEQUENT PREGNANCY, SECOND TRIMESTER: Primary | ICD-10-CM

## 2018-03-22 DIAGNOSIS — Z34.90 PREGNANCY, UNSPECIFIED GESTATIONAL AGE: ICD-10-CM

## 2018-03-22 PROCEDURE — PNV: Performed by: NURSE PRACTITIONER

## 2018-03-22 PROCEDURE — 36415 COLL VENOUS BLD VENIPUNCTURE: CPT

## 2018-03-22 NOTE — PROGRESS NOTES
Patient is doing well  Denies LOF/Bleeding/Cramping  FBS in the AM are in the 80's  Patient is scheduled for level 2 US 4/6/18  Completed 2nd part of sequential screening this morning  All questions and concerns addressed and patient verbalized understanding  Patient to return in 4 weeks

## 2018-03-23 LAB — SCAN RESULT: NORMAL

## 2018-03-26 ENCOUNTER — APPOINTMENT (OUTPATIENT)
Dept: LAB | Facility: HOSPITAL | Age: 33
End: 2018-03-26
Attending: OBSTETRICS & GYNECOLOGY
Payer: COMMERCIAL

## 2018-03-26 ENCOUNTER — TELEPHONE (OUTPATIENT)
Dept: OBGYN CLINIC | Facility: CLINIC | Age: 33
End: 2018-03-26

## 2018-03-26 ENCOUNTER — TRANSCRIBE ORDERS (OUTPATIENT)
Dept: LAB | Facility: HOSPITAL | Age: 33
End: 2018-03-26

## 2018-03-26 DIAGNOSIS — N30.00 ACUTE CYSTITIS WITHOUT HEMATURIA: ICD-10-CM

## 2018-03-26 DIAGNOSIS — N30.00 ACUTE CYSTITIS WITHOUT HEMATURIA: Primary | ICD-10-CM

## 2018-03-26 LAB
BACTERIA UR QL AUTO: ABNORMAL /HPF
BILIRUB UR QL STRIP: NEGATIVE
CLARITY UR: ABNORMAL
COLOR UR: YELLOW
GLUCOSE UR STRIP-MCNC: NEGATIVE MG/DL
HGB UR QL STRIP.AUTO: NEGATIVE
HYALINE CASTS #/AREA URNS LPF: ABNORMAL /LPF
KETONES UR STRIP-MCNC: NEGATIVE MG/DL
LEUKOCYTE ESTERASE UR QL STRIP: ABNORMAL
NITRITE UR QL STRIP: NEGATIVE
NON-SQ EPI CELLS URNS QL MICRO: ABNORMAL /HPF
PH UR STRIP.AUTO: 7 [PH] (ref 4.5–8)
PROT UR STRIP-MCNC: NEGATIVE MG/DL
RBC #/AREA URNS AUTO: ABNORMAL /HPF
SP GR UR STRIP.AUTO: 1.01 (ref 1–1.03)
UROBILINOGEN UR QL STRIP.AUTO: 0.2 E.U./DL
WBC #/AREA URNS AUTO: ABNORMAL /HPF

## 2018-03-26 PROCEDURE — 81001 URINALYSIS AUTO W/SCOPE: CPT

## 2018-03-26 NOTE — TELEPHONE ENCOUNTER
Pt is 18 5/7 wk pregnant - having sl dysuria, urgency, afebrile  Hx UTI but not x 1 yr  Will have U/A, C&S done this aft ([facility])  Will recall with results  To increase fluid intake

## 2018-03-27 NOTE — TELEPHONE ENCOUNTER
Pt sx unchanged - u/a shows occas bacteria, trace leukocytes - will await urine C&S results/JSW & will recall pt

## 2018-03-28 ENCOUNTER — TELEPHONE (OUTPATIENT)
Dept: PERINATAL CARE | Facility: CLINIC | Age: 33
End: 2018-03-28

## 2018-03-28 DIAGNOSIS — O24.410 DIET CONTROLLED GESTATIONAL DIABETES MELLITUS (GDM) IN SECOND TRIMESTER: Primary | ICD-10-CM

## 2018-03-28 DIAGNOSIS — N39.0 URINARY TRACT INFECTION WITHOUT HEMATURIA, SITE UNSPECIFIED: Primary | ICD-10-CM

## 2018-03-28 RX ORDER — CEPHALEXIN 500 MG/1
500 CAPSULE ORAL 2 TIMES DAILY
Qty: 10 CAPSULE | Refills: 0 | Status: SHIPPED | OUTPATIENT
Start: 2018-03-28 | End: 2018-04-02

## 2018-03-28 NOTE — TELEPHONE ENCOUNTER
Date:  18  RE: Veronique Guardian    : 1985  JACKIE: Estimated Date of Delivery: 18  EGA: 19w0d    Diet controlled gestational diabetes  Date Fasting Post-  breakfast Post-  lunch Post-  dinner Before bedtime Carbs Comments   3/21/18 82 91 104 109      3/22/18 87 91 99 126      3/23/18 87 91 86 85      3/24/18 92 112 92 123      3/25/18 79 86 96 115      3/26/18 88 82 96 106      3/27/18 91 95 86 126          Current regimen:  1500 calorie gestational diabetes meal plan with 3 meals/snacks, bedtime snack protein only 2 to 3 oz  Self blood glucose monitoring fasting and 2 hours after meals  Plan:  Increase to 1700 calorie diet with 3 meals & 3 snacks & continue 2-3 oz protein only (no carbohydrate foods) at bedtime snack  Continue BG monitoring  Emailed her a prescription for HbA1c blood test     Date due to report next:  Tuesday, 4-3-18 or sooner if blood sugars consistently out of goal range       Jelani Valdivia  Diabetes and Pregnancy Program

## 2018-03-28 NOTE — TELEPHONE ENCOUNTER
Pt  With same sx - lab did not do urine C&S - will tx with keflex 500 mg bid x 5 days/JSW - pt informed    [facility] [facility]

## 2018-04-04 ENCOUNTER — TELEPHONE (OUTPATIENT)
Dept: OBGYN CLINIC | Facility: CLINIC | Age: 33
End: 2018-04-04

## 2018-04-04 ENCOUNTER — TELEPHONE (OUTPATIENT)
Dept: PERINATAL CARE | Facility: CLINIC | Age: 33
End: 2018-04-04

## 2018-04-04 DIAGNOSIS — R35.0 URINARY FREQUENCY: Primary | ICD-10-CM

## 2018-04-04 NOTE — TELEPHONE ENCOUNTER
Pt is 20 wk pregnant - tx with Keflex for urinary sx - had U/A done prior to tx but lab did not do urine C&S - completed 4/2/18  She is still having some urinary urgency & discomfort/soreness in clitoral area

## 2018-04-04 NOTE — TELEPHONE ENCOUNTER
Date:  18  RE: Emory Julien    : 1985  JACKIE: Estimated Date of Delivery: 18  EGA: 20w0d    Diet controlled gestational diabetes  Glucose Meter: Accu-Chek Guide     Date Fasting Post-  breakfast Post-  lunch Post-  dinner Before bedtime Carbs Comments   3/28/18 86 82 113 129      3/29/18 90 88 116 104      3/30/18 80 78 128 112      3/31/18 88 94 87 108      18 95 98 No lunch 135      18 91 96 115 135      4/3/18 85 103 101 120          Current regimen:  1700 calorie gestational diabetes meal plan with 3 meals/snacks, bedtime snack protein only 2 to 3 oz  Self blood glucose monitoring fasting and 2 hours after meals  Plan:  Schedule Insulin Instruction soon  Continue diet with  2-3 oz protein only (no carbohydrate foods) at bedtime snack  Continue BG monitoring  Encouraged her to complete HbA1c blood test     Date due to report next: At insulin education appointment       Rafael Meek  Diabetes and Pregnancy Program

## 2018-04-05 NOTE — TELEPHONE ENCOUNTER
Left message for patient to return call  I have placed an order for a urine culture  I would like to see her in the office to examine her

## 2018-04-06 ENCOUNTER — ROUTINE PRENATAL (OUTPATIENT)
Dept: OBGYN CLINIC | Facility: CLINIC | Age: 33
End: 2018-04-06

## 2018-04-06 ENCOUNTER — OFFICE VISIT (OUTPATIENT)
Dept: PERINATAL CARE | Facility: CLINIC | Age: 33
End: 2018-04-06
Payer: COMMERCIAL

## 2018-04-06 ENCOUNTER — TELEPHONE (OUTPATIENT)
Dept: PERINATAL CARE | Facility: CLINIC | Age: 33
End: 2018-04-06

## 2018-04-06 ENCOUNTER — ROUTINE PRENATAL (OUTPATIENT)
Dept: PERINATAL CARE | Facility: CLINIC | Age: 33
End: 2018-04-06
Payer: COMMERCIAL

## 2018-04-06 ENCOUNTER — APPOINTMENT (OUTPATIENT)
Dept: LAB | Facility: HOSPITAL | Age: 33
End: 2018-04-06
Payer: COMMERCIAL

## 2018-04-06 VITALS — DIASTOLIC BLOOD PRESSURE: 78 MMHG | SYSTOLIC BLOOD PRESSURE: 120 MMHG | WEIGHT: 130 LBS | BODY MASS INDEX: 25.39 KG/M2

## 2018-04-06 VITALS
WEIGHT: 130.6 LBS | SYSTOLIC BLOOD PRESSURE: 109 MMHG | DIASTOLIC BLOOD PRESSURE: 64 MMHG | HEIGHT: 60 IN | HEART RATE: 89 BPM | BODY MASS INDEX: 25.64 KG/M2

## 2018-04-06 VITALS
WEIGHT: 130 LBS | HEIGHT: 60 IN | SYSTOLIC BLOOD PRESSURE: 109 MMHG | BODY MASS INDEX: 25.52 KG/M2 | HEART RATE: 89 BPM | DIASTOLIC BLOOD PRESSURE: 64 MMHG

## 2018-04-06 DIAGNOSIS — O24.414 INSULIN CONTROLLED GESTATIONAL DIABETES MELLITUS (GDM) IN SECOND TRIMESTER: ICD-10-CM

## 2018-04-06 DIAGNOSIS — Z36.86 ENCOUNTER FOR ANTENATAL SCREENING FOR CERVICAL LENGTH: ICD-10-CM

## 2018-04-06 DIAGNOSIS — E03.9 HYPOTHYROIDISM AFFECTING PREGNANCY IN SECOND TRIMESTER: ICD-10-CM

## 2018-04-06 DIAGNOSIS — O99.282 HYPOTHYROIDISM AFFECTING PREGNANCY IN SECOND TRIMESTER: ICD-10-CM

## 2018-04-06 DIAGNOSIS — Q51.28 PREGNANCY WITH CONGENITAL DUPLICATION OF UTERUS IN FIRST TRIMESTER: ICD-10-CM

## 2018-04-06 DIAGNOSIS — O99.282 HYPOTHYROIDISM AFFECTING PREGNANCY IN SECOND TRIMESTER: Primary | ICD-10-CM

## 2018-04-06 DIAGNOSIS — O24.414 INSULIN CONTROLLED GESTATIONAL DIABETES MELLITUS (GDM) IN SECOND TRIMESTER: Primary | ICD-10-CM

## 2018-04-06 DIAGNOSIS — O34.591 PREGNANCY WITH CONGENITAL DUPLICATION OF UTERUS IN FIRST TRIMESTER: ICD-10-CM

## 2018-04-06 DIAGNOSIS — O09.812 PREGNANCY RESULTING FROM IN VITRO FERTILIZATION IN SECOND TRIMESTER: ICD-10-CM

## 2018-04-06 DIAGNOSIS — Z3A.20 20 WEEKS GESTATION OF PREGNANCY: ICD-10-CM

## 2018-04-06 DIAGNOSIS — O44.02 PLACENTA PREVIA IN SECOND TRIMESTER: ICD-10-CM

## 2018-04-06 DIAGNOSIS — E03.9 HYPOTHYROIDISM, UNSPECIFIED TYPE: ICD-10-CM

## 2018-04-06 DIAGNOSIS — E03.9 HYPOTHYROIDISM AFFECTING PREGNANCY IN SECOND TRIMESTER: Primary | ICD-10-CM

## 2018-04-06 DIAGNOSIS — Z34.82 PRENATAL CARE, SUBSEQUENT PREGNANCY, SECOND TRIMESTER: Primary | ICD-10-CM

## 2018-04-06 LAB
ALBUMIN SERPL BCP-MCNC: 3.2 G/DL (ref 3.5–5)
ALP SERPL-CCNC: 44 U/L (ref 46–116)
ALT SERPL W P-5'-P-CCNC: 18 U/L (ref 12–78)
ANION GAP SERPL CALCULATED.3IONS-SCNC: 7 MMOL/L (ref 4–13)
AST SERPL W P-5'-P-CCNC: 15 U/L (ref 5–45)
BILIRUB SERPL-MCNC: 0.2 MG/DL (ref 0.2–1)
BUN SERPL-MCNC: 11 MG/DL (ref 5–25)
CALCIUM SERPL-MCNC: 9.4 MG/DL (ref 8.3–10.1)
CHLORIDE SERPL-SCNC: 107 MMOL/L (ref 100–108)
CO2 SERPL-SCNC: 27 MMOL/L (ref 21–32)
CREAT SERPL-MCNC: 0.45 MG/DL (ref 0.6–1.3)
EST. AVERAGE GLUCOSE BLD GHB EST-MCNC: 94 MG/DL
GFR SERPL CREATININE-BSD FRML MDRD: 133 ML/MIN/1.73SQ M
GLUCOSE SERPL-MCNC: 85 MG/DL (ref 65–140)
HBA1C MFR BLD: 4.9 % (ref 4.2–6.3)
POTASSIUM SERPL-SCNC: 3.9 MMOL/L (ref 3.5–5.3)
PROT SERPL-MCNC: 6.9 G/DL (ref 6.4–8.2)
SODIUM SERPL-SCNC: 141 MMOL/L (ref 136–145)
T4 FREE SERPL-MCNC: 1.09 NG/DL (ref 0.76–1.46)
TSH SERPL DL<=0.05 MIU/L-ACNC: 2 UIU/ML (ref 0.36–3.74)

## 2018-04-06 PROCEDURE — 84443 ASSAY THYROID STIM HORMONE: CPT

## 2018-04-06 PROCEDURE — 76811 OB US DETAILED SNGL FETUS: CPT | Performed by: OBSTETRICS & GYNECOLOGY

## 2018-04-06 PROCEDURE — 80053 COMPREHEN METABOLIC PANEL: CPT

## 2018-04-06 PROCEDURE — PNV: Performed by: NURSE PRACTITIONER

## 2018-04-06 PROCEDURE — 76817 TRANSVAGINAL US OBSTETRIC: CPT | Performed by: OBSTETRICS & GYNECOLOGY

## 2018-04-06 PROCEDURE — 87086 URINE CULTURE/COLONY COUNT: CPT | Performed by: NURSE PRACTITIONER

## 2018-04-06 PROCEDURE — 99212 OFFICE O/P EST SF 10 MIN: CPT | Performed by: OBSTETRICS & GYNECOLOGY

## 2018-04-06 PROCEDURE — 84439 ASSAY OF FREE THYROXINE: CPT

## 2018-04-06 PROCEDURE — G0108 DIAB MANAGE TRN  PER INDIV: HCPCS

## 2018-04-06 PROCEDURE — 83036 HEMOGLOBIN GLYCOSYLATED A1C: CPT | Performed by: DIETITIAN, REGISTERED

## 2018-04-06 PROCEDURE — 36415 COLL VENOUS BLD VENIPUNCTURE: CPT | Performed by: DIETITIAN, REGISTERED

## 2018-04-06 RX ORDER — LEVOTHYROXINE SODIUM 0.1 MG/1
100 TABLET ORAL DAILY
Qty: 30 TABLET | Refills: 2 | Status: SHIPPED | OUTPATIENT
Start: 2018-04-06 | End: 2018-07-08 | Stop reason: SDUPTHER

## 2018-04-06 NOTE — PROGRESS NOTES
Present vaginal pain, pressure, urinary urgency for 2 weeks  Small amt of discharge  Treated with Keflex for suspected UTI  Still experiencing same symptoms  Speculum exam reveals copious amt of white milky discharge with no odor  MDL cultured obtained  I have ordered a urine culture since it was not done with the last urinalysis  We will call her with culture results  2nd trimester TSH was ordered by  center  She is to return as scheduled on 18

## 2018-04-06 NOTE — TELEPHONE ENCOUNTER
Date:  18  RE: Ricky De Luna    : 1985  JACKIE: 18  EGA: Garima Esteves    Insulin controlled gestational diabetes in second trimester  Glucose Meter: Accu-Chek Guide     Date Fasting Post-  breakfast Post-  lunch Post-  dinner Before bedtime Carbs Comments   4-4 95 103 91 128      4-5 91 110 102 120      4-6 90                                                     Current regimen:  1700 calorie gestational diabetes meal plan with 3 meals/snacks, bedtime snack protein only 2 to 3 oz  Self blood glucose monitoring fasting and 2 hours after meals  Plan:  Start Levemir- 10 units at 9 am and 10 units at 9 pm  Case discussed with MARIYA Crowe  Continue diet and instructed patient to return to bedtime snack of carbohydrate and protein foods  Advised patient to not skip meals or snacks  Continue BG monitoring and requested patient check a 3 am blood glucose  Encouraged her to complete HbA1c, CMP, TSH with free T4 blood test     Date due to report next:  Monday, 18      Yaima Walsh RD,LDN,CDE  Diabetes and Pregnancy Program

## 2018-04-06 NOTE — LETTER
April 7, 2018     Halie Ashford MD  [address]    Patient: Ricky De Luna   YOB: 1985   Date of Visit: 4/6/2018       Dear Dr Saldana Stager: Thank you for referring Ricky De Luna to me for evaluation  Below are my notes for this consultation  If you have questions, please do not hesitate to call me  I look forward to following your patient along with you  Sincerely,        Myriam Connolly MD        CC: No Recipients  Myriam Connolly MD  4/6/2018  9:10 AM  Sign at close encounter  Please refer to the [facility] ultrasound report in Ob Procedures for additional information regarding the visit to the [facility]  today

## 2018-04-06 NOTE — PROGRESS NOTES
Please refer to the [facility] ultrasound report in Ob Procedures for additional information regarding the visit to the [facility]  today

## 2018-04-06 NOTE — PROGRESS NOTES
DATE: 18   RE: Kodi Nielsen   : 1985  JACKIE: 18  EGA:  20w2d    Dear Dr Ricarda Fox,     Your patient was seen in the office today for insulin teaching  Please refer to Diabetes and Pregnancy Progress Record for complete documentation of patients blood glucose levels  Height: 5' (1 524 m)   Weight: 59 kg (130 lb)    The patient was instructed on the following:     Levemir Pen use  Initiate 10 units at 9 am and  10 units at 9 pm    Insulin administration times, insulin action   Hypoglycemia signs, symptoms and treatment   Increase in maternal-fetal surveillance with insulin initiation   Side effects of hyperglycemia in pregnancy including macrosomia,  hypoglycemia, polyhydramnios, pre-term labor and stillbirth   Continue to monitor blood glucoses via fingerstick fasting (goal 60 mg/dl to 90 mg/dl) and two hours post prandial (goal less than 120 mg/dl)  Also requested patient check a 3 am blood glucose   Follow up with our office on Monday, 18 for evaluation of blood glucose levels   Non-stress testing two times weekly and MERARI testing beginning at 32 weeks gestation   Ultrasounds every 4 weeks at the [address]   HbA1c every 6 to 8 weeks, CMP and a TSH with free T4 was ordered  Thank you for the opportunity to participate in the care of this patient  I can be reached at [phone] should you have any questions  Time spent with patient 30 minutes; time spent face to face counseling greater than 50% of the appointment      Sincerely,     Mustapha Patton, RD,LDN,CDE  Diabetes Educator  Diabetes and Pregnancy Program

## 2018-04-07 PROBLEM — O09.812 PREGNANCY RESULTING FROM IN VITRO FERTILIZATION IN SECOND TRIMESTER: Status: ACTIVE | Noted: 2018-04-07

## 2018-04-07 PROBLEM — O44.02 PLACENTA PREVIA IN SECOND TRIMESTER: Status: ACTIVE | Noted: 2018-04-07

## 2018-04-07 LAB — BACTERIA UR CULT: NORMAL

## 2018-04-09 ENCOUNTER — TELEPHONE (OUTPATIENT)
Dept: PERINATAL CARE | Facility: CLINIC | Age: 33
End: 2018-04-09

## 2018-04-09 NOTE — TELEPHONE ENCOUNTER
Date:  18  RE: Jimena Patterson    : 1985  JACKIE: Estimated Date of Delivery: 18  EGA: Teodoro Contreras  ObGyn: leonidas      Date Fasting Post-  breakfast Post-  lunch Post-  dinner Before bedtime Carbs Comments  0300 BG    4-6 90 107 103 135      4-7 77 85 101 108   95   4-8 73 94 99 101   80    4-9 76 92     74                           Current regimen:  Levemir-10 units at 0900     10 units at 2100  1700 calorie gestational diabetes diet with 3 meals/snacks  Self blood glucose monitoring fasting and 2 hours after meals with Accu-chek Guide meter        Plan:  Continue current regimen      Date due to report next:  Thursday, 18    Juventino Asencio RN  Diabetes Educator   [facility]regnancy [facility]

## 2018-04-10 ENCOUNTER — TELEPHONE (OUTPATIENT)
Dept: OBGYN CLINIC | Facility: CLINIC | Age: 33
End: 2018-04-10

## 2018-04-10 DIAGNOSIS — N76.0 BACTERIAL VAGINOSIS: Primary | ICD-10-CM

## 2018-04-10 DIAGNOSIS — B96.89 BACTERIAL VAGINOSIS: Primary | ICD-10-CM

## 2018-04-10 RX ORDER — CLINDAMYCIN PHOSPHATE 20 MG/G
1 CREAM VAGINAL
Qty: 40 G | Refills: 0 | Status: SHIPPED | OUTPATIENT
Start: 2018-04-10 | End: 2018-04-17

## 2018-04-13 ENCOUNTER — TELEPHONE (OUTPATIENT)
Dept: PERINATAL CARE | Facility: CLINIC | Age: 33
End: 2018-04-13

## 2018-04-13 NOTE — TELEPHONE ENCOUNTER
Date:  18  RE: April Ramires    : 1985  JACKIE: Estimated Date of Delivery: 18  EGA: Charity Barrera  ObGyn: Elisa Abdi    Insulin controlled gestational diabetes    Date Fasting Post-  breakfast Post-  lunch Post-  dinner Before bedtime Carbs Comments  0300 BG    18 76 92 100 118      4/10/18 78 91 96 111      18 76 101 90 128      18 78 85 102 131                              Current regimen:  Levemir-10 units at 0900     10 units at 2100  1700 calorie gestational diabetes diet with 3 meals/snacks  Self blood glucose monitoring fasting and 2 hours after meals with Accu-chek Guide meter        Plan:  Levemir--Increase to 11 units at 0900                 10 units at 2100  Continue diet & BG monitoring       Date due to report next:  Tuesday, 18    Roxanne Dalal  Diabetes Educator   [facility]regnancy [facility]

## 2018-04-18 ENCOUNTER — TELEPHONE (OUTPATIENT)
Dept: PERINATAL CARE | Facility: CLINIC | Age: 33
End: 2018-04-18

## 2018-04-18 NOTE — TELEPHONE ENCOUNTER
Date:  18  RE: Wan Lua    : 1985  Estimated Date of Delivery: 18  EGA: 22w0d  OB/GYN: Katie Molina      Date Fasting Post-  breakfast Post-  lunch Post-  dinner Before bedtime Carbs Comments   4-13 79 89 95 141      4-14 76 95 96 104      4-15 68 97 115 125      4-16 80 83 105 115      4-17 77 96 110 171+   += Minh chicas                 Current regimen:  Levemir-11 units at 0900                10 units at 2100  1700 calorie gestational diabetes diet with 3 meals/snacks  Self blood glucose monitoring fasting and 2 hours after meals with Accu-chek Guide meter       Plan:  Levemir- increase to 13 units at 0900      continue 10 units at 2100    Date due to report next:  Monday, 18    Sincerely,   Grazyna Beckham RN  Diabetes Educator   Di[facility]regnancy [facility]  The diabetic educator note was reviewed by me  I agree with her note

## 2018-04-19 ENCOUNTER — TELEPHONE (OUTPATIENT)
Dept: OBGYN CLINIC | Facility: CLINIC | Age: 33
End: 2018-04-19

## 2018-04-19 ENCOUNTER — ROUTINE PRENATAL (OUTPATIENT)
Dept: PERINATAL CARE | Facility: CLINIC | Age: 33
End: 2018-04-19
Payer: COMMERCIAL

## 2018-04-19 VITALS
HEART RATE: 86 BPM | HEIGHT: 60 IN | SYSTOLIC BLOOD PRESSURE: 113 MMHG | DIASTOLIC BLOOD PRESSURE: 66 MMHG | WEIGHT: 134 LBS | BODY MASS INDEX: 26.31 KG/M2

## 2018-04-19 DIAGNOSIS — O09.812 PREGNANCY RESULTING FROM IN VITRO FERTILIZATION IN SECOND TRIMESTER: ICD-10-CM

## 2018-04-19 DIAGNOSIS — Z3A.22 22 WEEKS GESTATION OF PREGNANCY: ICD-10-CM

## 2018-04-19 DIAGNOSIS — O24.414 INSULIN CONTROLLED GESTATIONAL DIABETES MELLITUS (GDM) IN SECOND TRIMESTER: Primary | ICD-10-CM

## 2018-04-19 PROBLEM — Z3A.12 12 WEEKS GESTATION OF PREGNANCY: Status: RESOLVED | Noted: 2018-02-07 | Resolved: 2018-04-19

## 2018-04-19 PROBLEM — O24.419 GESTATIONAL DIABETES MELLITUS (GDM) IN FIRST TRIMESTER: Status: RESOLVED | Noted: 2018-02-07 | Resolved: 2018-04-19

## 2018-04-19 PROCEDURE — 76827 ECHO EXAM OF FETAL HEART: CPT | Performed by: OBSTETRICS & GYNECOLOGY

## 2018-04-19 PROCEDURE — 93325 DOPPLER ECHO COLOR FLOW MAPG: CPT | Performed by: OBSTETRICS & GYNECOLOGY

## 2018-04-19 PROCEDURE — 76825 ECHO EXAM OF FETAL HEART: CPT | Performed by: OBSTETRICS & GYNECOLOGY

## 2018-04-19 NOTE — PROGRESS NOTES
Please refer to the [facility] ultrasound report in Ob Procedures for additional information regarding the visit to the [facility]  today

## 2018-04-19 NOTE — LETTER
April 19, 2018     Halie Ashford MD  [facility] [address]    Patient: Ricky De Luna   YOB: 1985   Date of Visit: 4/19/2018       Dear Dr Saldana Stager: Thank you for referring Ricky De Luna to me for evaluation  Below are my notes for this consultation  If you have questions, please do not hesitate to call me  I look forward to following your patient along with you  Sincerely,        Myriam Connolly MD        CC: No Recipients  Myriam Connolly MD  4/19/2018 10:36 AM  Signed  Please refer to the [facility] ultrasound report in Ob Procedures for additional information regarding the visit to the [facility]  today

## 2018-04-19 NOTE — TELEPHONE ENCOUNTER
Spoke with patient  She denies vaginal discharge  She does feel an internal vaginal fullness/pressure  It is better than what it was before since the treatment with Flagyl  She is scheduled to be seen on Monday 4/23  She knows to call with worsening or new symptoms

## 2018-04-19 NOTE — TELEPHONE ENCOUNTER
Pt wants to ask about symptoms still having  Thinks might still have same as before, but not sure    Please call [phone]

## 2018-04-23 ENCOUNTER — ROUTINE PRENATAL (OUTPATIENT)
Dept: OBGYN CLINIC | Facility: CLINIC | Age: 33
End: 2018-04-23

## 2018-04-23 VITALS — WEIGHT: 134.2 LBS | DIASTOLIC BLOOD PRESSURE: 62 MMHG | BODY MASS INDEX: 26.21 KG/M2 | SYSTOLIC BLOOD PRESSURE: 100 MMHG

## 2018-04-23 DIAGNOSIS — Z34.82 PRENATAL CARE, SUBSEQUENT PREGNANCY, SECOND TRIMESTER: Primary | ICD-10-CM

## 2018-04-23 PROCEDURE — PNV: Performed by: OBSTETRICS & GYNECOLOGY

## 2018-04-23 NOTE — PROGRESS NOTES
Patient is doing well  Denies LOF/Bleeding/Cramping  Baby is moving  18 - Fetal echocardiogram - normal  Patient is schedule at 28 weeks with  center for growth scan and placenta previa  Return in 4 weeks

## 2018-04-25 ENCOUNTER — TELEPHONE (OUTPATIENT)
Dept: PERINATAL CARE | Facility: CLINIC | Age: 33
End: 2018-04-25

## 2018-04-25 NOTE — TELEPHONE ENCOUNTER
Date:  18  RE: Meg Mims    : 1985  Estimated Date of Delivery: 18  EGA: 23w0d  OB/GYN: Kylah Pradhan    Insulin controlled gestational diabetes and Hypothyroidism  Date Fasting Post-  breakfast Post-  lunch Post-  dinner Before bedtime Carbs Comments   18 85 93 93 112      4/191/8 74 97 96 117      18 80 111 110 112      18 70 91 128 105      18 81 110 80 120      18 78 102 110 105      18 82 82 113 110        Current regimen:  Levemir-13 units at 0900                10 units at 2100  1700 calorie gestational diabetes diet with 3 meals/snacks  Self blood glucose monitoring fasting and 2 hours after meals with Accu-chek Guide meter       Plan:  Continue above regimen  18 A1c 4 9%, TSH 2 0, free T4 1 09, on Levothyroxine 100 mcg daily      Date due to report next:  18    Eve Eli  Diabetes Educator   Diabetes [facility] Pregnancy Program

## 2018-05-01 DIAGNOSIS — O24.419 GESTATIONAL DIABETES MELLITUS (GDM) IN FIRST TRIMESTER, GESTATIONAL DIABETES METHOD OF CONTROL UNSPECIFIED: ICD-10-CM

## 2018-05-01 DIAGNOSIS — Z3A.12 12 WEEKS GESTATION OF PREGNANCY: ICD-10-CM

## 2018-05-01 RX ORDER — BLOOD SUGAR DIAGNOSTIC
STRIP MISCELLANEOUS
Qty: 100 EACH | Refills: 3 | Status: SHIPPED | OUTPATIENT
Start: 2018-05-01 | End: 2018-08-13 | Stop reason: HOSPADM

## 2018-05-03 ENCOUNTER — TELEPHONE (OUTPATIENT)
Dept: PERINATAL CARE | Facility: CLINIC | Age: 33
End: 2018-05-03

## 2018-05-03 NOTE — TELEPHONE ENCOUNTER
Date:  18  RE: Jhonny Mehta    : 1985  Estimated Date of Delivery: 18  EGA: 24w1d  OB/GYN: Nora Schultz    Insulin controlled GDM and Hypothyroidism  Date Fasting Post-  breakfast Post-  lunch Post-  dinner Before bedtime Carbs Comments   - 82 79 122 107      4- 75 110 100 130      4-27 76 79 84 120      - 81 100 91 119      4- 95 122 121 115      4-30 80 100 104 143      5-1 74 83 79 117          Current regimen:  Levemir-13 units at 0900                10 units at 2100  1700 calorie gestational diabetes diet with 3 meals/snacks including protein  Self monitoring blood glucose (SMBG)  fasting and 2 hours after meals with Accu-chek Guide meter  Plan:  Levemir- increase from 13 to 15 units at 0900      Continue 10 units at 2100  Continue SMBG and diet  18 A1C 4 9%, TSH 2 0, free T4 1 09  Hypothyroidism, on  Levothyroxine increased from 75 to 100 mcg daily, due for repeat TSH/free T4       Date due to report next:  18    Iesha Madison RN  Diabetes Educator   Diabetes [facility] Pregnancy Program

## 2018-05-10 ENCOUNTER — TELEPHONE (OUTPATIENT)
Dept: PERINATAL CARE | Facility: CLINIC | Age: 33
End: 2018-05-10

## 2018-05-10 NOTE — TELEPHONE ENCOUNTER
Date:  05/10/18  RE: April Ramires    : 1985  Estimated Date of Delivery: 18  EGA: 25w1d  OB/GYN: Elisa Abdi    Insulin controlled GDM and Hypothyroidism  Date Fasting Post-  breakfast Post-  lunch Post-  dinner Before bedtime Carbs Comments   5-2 74 104 115 141      5-3 86 88 96 122      5-4 73 86 106 126      5-5 82 96 77 110      5-6 81 104 106 144      5-7 75 79 101 121      5-8 80 97 95 112          Current regimen:  Levemir-15 units at 0900                10 units at 2100  1700 calorie gestational diabetes diet with 3 meals/snacks including protein  Self monitoring blood glucose (SMBG)  fasting and 2 hours after meals with Accu-chek Guide meter  Plan:  Levemir- increase from 15 to 17 units at 0900      Continue 10 units at 2100  Patient may benefit from Metformin 500 mg at dinner meal  Will advise patient and await her decision regarding starting Metformin  Continue SMBG and diet  18 A1C 4 9%, TSH 2 0, free T4 1 09  Hypothyroidism, on  Levothyroxine increased from 75 to 100 mcg daily, due for repeat TSH/free T4       Date due to report next:  Tuesday, 5-15-18    Steven Noland, RD,LDN,CDE  Diabetes Educator   Diabetes [facility] Pregnancy Program

## 2018-05-11 ENCOUNTER — TELEPHONE (OUTPATIENT)
Dept: PERINATAL CARE | Facility: CLINIC | Age: 33
End: 2018-05-11

## 2018-05-11 DIAGNOSIS — O24.419 GESTATIONAL DIABETES MELLITUS (GDM) IN FIRST TRIMESTER, GESTATIONAL DIABETES METHOD OF CONTROL UNSPECIFIED: ICD-10-CM

## 2018-05-11 DIAGNOSIS — Z3A.12 12 WEEKS GESTATION OF PREGNANCY: ICD-10-CM

## 2018-05-11 RX ORDER — LANCETS
EACH MISCELLANEOUS
Qty: 102 EACH | Refills: 3 | Status: CANCELLED | OUTPATIENT
Start: 2018-05-11 | End: 2018-08-22

## 2018-05-11 RX ORDER — LANCETS
EACH MISCELLANEOUS
Qty: 102 EACH | Refills: 3 | Status: SHIPPED | OUTPATIENT
Start: 2018-05-11

## 2018-05-11 RX ORDER — LANCETS
EACH MISCELLANEOUS
Qty: 102 EACH | Refills: 1 | Status: SHIPPED | OUTPATIENT
Start: 2018-05-11 | End: 2018-05-11 | Stop reason: SDUPTHER

## 2018-05-16 ENCOUNTER — TELEPHONE (OUTPATIENT)
Dept: PERINATAL CARE | Facility: CLINIC | Age: 33
End: 2018-05-16

## 2018-05-16 DIAGNOSIS — Q51.28 PREGNANCY WITH CONGENITAL DUPLICATION OF UTERUS IN FIRST TRIMESTER: ICD-10-CM

## 2018-05-16 DIAGNOSIS — O34.591 PREGNANCY WITH CONGENITAL DUPLICATION OF UTERUS IN FIRST TRIMESTER: ICD-10-CM

## 2018-05-16 NOTE — TELEPHONE ENCOUNTER
Note      Date:  18  RE: Destiny Chanel    : 1985  Estimated Date of Delivery: 18  EGA: 26w0d  OB/GYN: Lindsey Leyva     Insulin controlled GDM and Hypothyroidism         Date Fasting Post-  breakfast Post-  lunch Post-  dinner Before bedtime Carbs Comments   18 83 104 107 140         5/10/18 75 84 101 98         18 83 89 107 104         18 85 94 96 114         18 74 90 93 110         18 75 102 104 121         5/15/18 77 119 94 130               Current regimen:  Levemir-17 units at 0930                10 units at 2100  1700 calorie gestational diabetes diet with 3 meals/snacks including protein  Self monitoring blood glucose (SMBG)  fasting and 2 hours after meals with Accu-chek Guide meter  Not interested in Metformin at this time       Plan:  Levemir- increase from 17 to 20 units at 0930                 Continue 10 units at 2130  Continue SMBG, switch to 1 hour after meals  Continue diet  18 A1C 4 9%, TSH 2 0, free T4 1 09  Hypothyroidism, on  Levothyroxine increased from 75 to 100 mcg daily, due for repeat TSH/free T4, informed to have labs done, order in system     Verbal instructions given via phone       Date due to report next:  Monday, 18    Transcribed by:  Jennifer Gonzales blood sugar login via [address]  Diabetes Educator   Diabetes [facility] Pregnancy Program

## 2018-05-21 ENCOUNTER — TELEPHONE (OUTPATIENT)
Dept: PERINATAL CARE | Facility: CLINIC | Age: 33
End: 2018-05-21

## 2018-05-21 ENCOUNTER — APPOINTMENT (OUTPATIENT)
Dept: LAB | Facility: HOSPITAL | Age: 33
End: 2018-05-21
Payer: COMMERCIAL

## 2018-05-21 ENCOUNTER — ROUTINE PRENATAL (OUTPATIENT)
Dept: OBGYN CLINIC | Facility: CLINIC | Age: 33
End: 2018-05-21

## 2018-05-21 VITALS — DIASTOLIC BLOOD PRESSURE: 76 MMHG | BODY MASS INDEX: 26.95 KG/M2 | SYSTOLIC BLOOD PRESSURE: 120 MMHG | WEIGHT: 138 LBS

## 2018-05-21 DIAGNOSIS — Z34.82 PRENATAL CARE, SUBSEQUENT PREGNANCY, SECOND TRIMESTER: Primary | ICD-10-CM

## 2018-05-21 DIAGNOSIS — O24.414 INSULIN CONTROLLED GESTATIONAL DIABETES MELLITUS (GDM) IN SECOND TRIMESTER: ICD-10-CM

## 2018-05-21 DIAGNOSIS — E03.9 HYPOTHYROIDISM AFFECTING PREGNANCY IN SECOND TRIMESTER: ICD-10-CM

## 2018-05-21 DIAGNOSIS — O99.282 HYPOTHYROIDISM AFFECTING PREGNANCY IN SECOND TRIMESTER: ICD-10-CM

## 2018-05-21 LAB
BASOPHILS # BLD AUTO: 0.03 THOUSANDS/ΜL (ref 0–0.1)
BASOPHILS NFR BLD AUTO: 0 % (ref 0–1)
BASOPHILS NFR MAR MANUAL: 0 % (ref 0–1)
EOSINOPHIL # BLD AUTO: 0.08 THOUSAND/ΜL (ref 0–0.61)
EOSINOPHIL NFR BLD AUTO: 1 % (ref 0–6)
EOSINOPHIL NFR BLD MANUAL: 0 % (ref 0–6)
ERYTHROCYTE [DISTWIDTH] IN BLOOD BY AUTOMATED COUNT: 12.4 % (ref 11.6–15.1)
HCT VFR BLD AUTO: 36 % (ref 34.8–46.1)
HGB BLD-MCNC: 12 G/DL (ref 11.5–15.4)
LYMPHOCYTES # BLD AUTO: 2.11 THOUSANDS/ΜL (ref 0.6–4.47)
LYMPHOCYTES # BLD AUTO: 2.59 THOUSAND/UL (ref 0.6–4.47)
LYMPHOCYTES # BLD AUTO: 26 % (ref 14–44)
LYMPHOCYTES NFR BLD AUTO: 21 % (ref 14–44)
MCH RBC QN AUTO: 31.5 PG (ref 26.8–34.3)
MCHC RBC AUTO-ENTMCNC: 33.3 G/DL (ref 31.4–37.4)
MCV RBC AUTO: 95 FL (ref 82–98)
METAMYELOCYTES NFR BLD MANUAL: 1 % (ref 0–1)
MONOCYTES # BLD AUTO: 0.65 THOUSAND/ΜL (ref 0.17–1.22)
MONOCYTES # BLD AUTO: 6.98 THOUSAND/UL (ref 0–1.22)
MONOCYTES NFR BLD AUTO: 7 % (ref 4–12)
MONOCYTES NFR BLD: 7 % (ref 4–12)
MYELOCYTES NFR BLD MANUAL: 2 % (ref 0–1)
NEUTROPHILS # BLD AUTO: 6.82 THOUSANDS/ΜL (ref 1.85–7.62)
NEUTROPHILS # BLD MANUAL: 6.68 THOUSAND/UL (ref 1.85–7.62)
NEUTS BAND NFR BLD MANUAL: 2 % (ref 0–8)
NEUTS SEG NFR BLD AUTO: 62 % (ref 43–75)
NEUTS SEG NFR BLD AUTO: 68 % (ref 43–75)
NRBC BLD AUTO-RTO: 0 /100 WBCS
PLATELET # BLD AUTO: 247 THOUSANDS/UL (ref 149–390)
PLATELET BLD QL SMEAR: ADEQUATE
PMV BLD AUTO: 8.2 FL (ref 8.9–12.7)
RBC # BLD AUTO: 3.81 MILLION/UL (ref 3.81–5.12)
T4 FREE SERPL-MCNC: 1.25 NG/DL (ref 0.76–1.46)
TOTAL CELLS COUNTED SPEC: 100
TSH SERPL DL<=0.05 MIU/L-ACNC: 0.77 UIU/ML (ref 0.36–3.74)
WBC # BLD AUTO: 9.97 THOUSAND/UL (ref 4.31–10.16)

## 2018-05-21 PROCEDURE — 86592 SYPHILIS TEST NON-TREP QUAL: CPT | Performed by: OBSTETRICS & GYNECOLOGY

## 2018-05-21 PROCEDURE — 36415 COLL VENOUS BLD VENIPUNCTURE: CPT | Performed by: OBSTETRICS & GYNECOLOGY

## 2018-05-21 PROCEDURE — 85007 BL SMEAR W/DIFF WBC COUNT: CPT | Performed by: OBSTETRICS & GYNECOLOGY

## 2018-05-21 PROCEDURE — 85025 COMPLETE CBC W/AUTO DIFF WBC: CPT | Performed by: OBSTETRICS & GYNECOLOGY

## 2018-05-21 PROCEDURE — PNV: Performed by: OBSTETRICS & GYNECOLOGY

## 2018-05-21 PROCEDURE — 84443 ASSAY THYROID STIM HORMONE: CPT

## 2018-05-21 PROCEDURE — 84439 ASSAY OF FREE THYROXINE: CPT

## 2018-05-21 PROCEDURE — 85027 COMPLETE CBC AUTOMATED: CPT | Performed by: OBSTETRICS & GYNECOLOGY

## 2018-05-21 NOTE — TELEPHONE ENCOUNTER
Date:  18  RE: John Vallejo    : 1985  Estimated Date of Delivery: 18  EGA: 26w5d  OB/GYN: Karma Diego      Date Fasting Post-  breakfast Post-  lunch Post-  dinner Before bedtime Carbs Comments   5-16 76 101 107 122      5-17 80 95 118 155      5-18 84 100 127 139      5-19 81 135 128 114      5-20 85 140 109 130      5-21 82 101                      Current regimen:  Levemir-20 units at 0930                10 units at 2100  1700 calorie gestational diabetes diet with 3 meals/snacks including protein  Self monitoring blood glucose (SMBG)  fasting and 1 hour after meals with Accu-chek Guide meter  Not interested in Metformin at this time  Plan:  Levemir- increase to 26 units at 0930      continue 10 units at 2100   continue diet and SMBG      Date due to report next:  Tuesday, 18    Philipp Mcnulty, RN  Diabetes Educator   Diabetes [facility] Pregnancy Program

## 2018-05-21 NOTE — PROGRESS NOTES
Patient is doing well  Denies LOF/Bleeding/Cramping  She complains of leg cramps with long periods and at bedtime  Patient advised she can use two TUMs daily and Magnesium OTC daily to help with the leg cramps  Baby is moving well     She is to return in 2 weeks for nurse visit for her Rhogam    She is to return in 4 weeks for routine prenatal exam

## 2018-05-22 LAB — RPR SER QL: NORMAL

## 2018-05-30 ENCOUNTER — ROUTINE PRENATAL (OUTPATIENT)
Dept: OBGYN CLINIC | Facility: CLINIC | Age: 33
End: 2018-05-30
Payer: COMMERCIAL

## 2018-05-30 ENCOUNTER — DOCUMENTATION (OUTPATIENT)
Dept: PERINATAL CARE | Facility: CLINIC | Age: 33
End: 2018-05-30

## 2018-05-30 VITALS
SYSTOLIC BLOOD PRESSURE: 110 MMHG | WEIGHT: 141.1 LBS | DIASTOLIC BLOOD PRESSURE: 60 MMHG | HEIGHT: 60 IN | BODY MASS INDEX: 27.7 KG/M2

## 2018-05-30 DIAGNOSIS — E03.9 HYPOTHYROIDISM, UNSPECIFIED TYPE: ICD-10-CM

## 2018-05-30 DIAGNOSIS — Q51.28 PREGNANCY WITH CONGENITAL DUPLICATION OF UTERUS IN FIRST TRIMESTER: ICD-10-CM

## 2018-05-30 DIAGNOSIS — O26.899 RH NEGATIVE STATE IN ANTEPARTUM PERIOD: ICD-10-CM

## 2018-05-30 DIAGNOSIS — O34.591 PREGNANCY WITH CONGENITAL DUPLICATION OF UTERUS IN FIRST TRIMESTER: ICD-10-CM

## 2018-05-30 DIAGNOSIS — Z29.13 NEED FOR RHOGAM DUE TO RH NEGATIVE MOTHER: Primary | ICD-10-CM

## 2018-05-30 DIAGNOSIS — Z67.91 RH NEGATIVE STATE IN ANTEPARTUM PERIOD: ICD-10-CM

## 2018-05-30 PROCEDURE — 96372 THER/PROPH/DIAG INJ SC/IM: CPT | Performed by: NURSE PRACTITIONER

## 2018-05-30 NOTE — PROGRESS NOTES
Note      Date:  18  RE: Brynn López    : 1985  Estimated Date of Delivery: 18  EGA: 28w0d  OB/GYN: Kait Wilcox     Insulin controlled GDM and Hypothyroidism         Date Fasting Post-  breakfast Post-  lunch Post-  dinner Comments    80 175 127 130  Cereal/banana    85 127 105 150      75 135 115 113      72 115 108 127      79 102 116 140      78 109 115 138      77 103 113 101           Current regimen:  Levemir-26 units at 0930                10 units at 2130  1700 calorie gestational diabetes diet with 3 meals/snacks including protein  Self monitoring blood glucose (SMBG)  fasting and 1 hour after meals with Accu-chek Guide meter  Not interested in Metformin at this time       Plan:  Levemir- continue 26 units at 0930                 continue 10 units at 2130  Continue SMBG and diet  18 A1C 4 9%, TSH 0 771, free T4 1 25  Hypothyroidism on 100 mcg daily  Repeat TSH, free T4 and A1c on 18  Schedule follow-up with dietician     Has fetal growth US scheduled for 18       Date due to report next:  18    MARIYA Wooten  Diabetes Educator   Diabetes [facility] Pregnancy Program

## 2018-05-31 ENCOUNTER — ULTRASOUND (OUTPATIENT)
Dept: PERINATAL CARE | Facility: CLINIC | Age: 33
End: 2018-05-31
Payer: COMMERCIAL

## 2018-05-31 VITALS
HEIGHT: 60 IN | BODY MASS INDEX: 27.96 KG/M2 | SYSTOLIC BLOOD PRESSURE: 111 MMHG | HEART RATE: 89 BPM | DIASTOLIC BLOOD PRESSURE: 73 MMHG | WEIGHT: 142.4 LBS

## 2018-05-31 DIAGNOSIS — O24.414 INSULIN CONTROLLED GESTATIONAL DIABETES MELLITUS (GDM) IN SECOND TRIMESTER: ICD-10-CM

## 2018-05-31 DIAGNOSIS — Z3A.28 28 WEEKS GESTATION OF PREGNANCY: ICD-10-CM

## 2018-05-31 DIAGNOSIS — Q51.28 PREGNANCY WITH CONGENITAL DUPLICATION OF UTERUS IN FIRST TRIMESTER: ICD-10-CM

## 2018-05-31 DIAGNOSIS — Z36.89 ENCOUNTER FOR ULTRASOUND TO CHECK FETAL GROWTH: ICD-10-CM

## 2018-05-31 DIAGNOSIS — O44.40 LOW-LYING PLACENTA: Primary | ICD-10-CM

## 2018-05-31 DIAGNOSIS — O34.591 PREGNANCY WITH CONGENITAL DUPLICATION OF UTERUS IN FIRST TRIMESTER: ICD-10-CM

## 2018-05-31 PROCEDURE — 99212 OFFICE O/P EST SF 10 MIN: CPT | Performed by: OBSTETRICS & GYNECOLOGY

## 2018-05-31 PROCEDURE — 76816 OB US FOLLOW-UP PER FETUS: CPT | Performed by: OBSTETRICS & GYNECOLOGY

## 2018-05-31 PROCEDURE — 76817 TRANSVAGINAL US OBSTETRIC: CPT | Performed by: OBSTETRICS & GYNECOLOGY

## 2018-05-31 NOTE — PATIENT INSTRUCTIONS
Thank you for choosing [facility] for your  care today  If you have any questions about your ultrasound or care, please do not hesitate to contact us or your primary obstetrician  Please return in 4 weeks for your next ultrasound to check on the fetal growth and to start nonstress testing twice weekly

## 2018-05-31 NOTE — PROGRESS NOTES
A transvaginal ultrasound was performed  Sonographer note on use of High Level Disinfection Process (Trophon) for transvaginal probe# 5 used, serial [id number]    Donnie Marroquin RDMS

## 2018-05-31 NOTE — PROGRESS NOTES
[address] [address]: Ms Iraj Palacio was seen today at 28w1d for followup placental location ultrasound with fetal growth measurement  See ultrasound report under "OB Procedures" tab  Please don't hesitate to contact our office with any concerns or questions    Neva Huang MD

## 2018-06-05 DIAGNOSIS — J45.909 MILD REACTIVE AIRWAYS DISEASE, UNSPECIFIED WHETHER PERSISTENT: Primary | ICD-10-CM

## 2018-06-06 ENCOUNTER — TELEPHONE (OUTPATIENT)
Dept: PERINATAL CARE | Facility: CLINIC | Age: 33
End: 2018-06-06

## 2018-06-06 NOTE — TELEPHONE ENCOUNTER
Note      Date:  18  RE: Wan Lua    : 1985  Estimated Date of Delivery: 18  EGA: 29w0d  OB/GYN: Katie Molina     Insulin controlled GDM and Hypothyroidism         Date Fasting Post-  breakfast Post-  lunch Post-  dinner Comments   18 101 161 119 127    18 91 107 137 108    18 83 165 107 101 Pancakes & dessert   18 85 147 110 117    6/3/18 75 121 123 114    18 98 133 123 131    18 85 135 125-2 hrs           Current regimen:  Levemir-26 units at 0930                10 units at 2130  1700 calorie gestational diabetes diet with 3 meals/snacks including protein  Self monitoring blood glucose (SMBG)  fasting and 1 hour after meals with Accu-chek Guide meter  Not interested in Metformin at this time       Plan:   Levemir- continue 26 units at 0930                 continue 10 units at 2130  Continue SMBG and diet  Advised to avoid pancakes & dessert  Advised patient to eat a small breakfast sandwich only at breakfast   18 A1C 4 9%, TSH 0 771, free T4 1 25  Hypothyroidism on 100 mcg daily  Repeat TSH, free T4 and A1c on 18     Schedule follow-up with dietician to be scheduled        Date due to report next:  Tuesday, 18    MARIYA Felipe  Diabetes Educator   Diabetes [facility] Pregnancy Program

## 2018-06-13 ENCOUNTER — TELEPHONE (OUTPATIENT)
Dept: PERINATAL CARE | Facility: CLINIC | Age: 33
End: 2018-06-13

## 2018-06-13 DIAGNOSIS — O34.591 PREGNANCY WITH CONGENITAL DUPLICATION OF UTERUS IN FIRST TRIMESTER: ICD-10-CM

## 2018-06-13 DIAGNOSIS — E03.9 HYPOTHYROIDISM, UNSPECIFIED TYPE: ICD-10-CM

## 2018-06-13 DIAGNOSIS — Q51.28 PREGNANCY WITH CONGENITAL DUPLICATION OF UTERUS IN FIRST TRIMESTER: ICD-10-CM

## 2018-06-13 NOTE — TELEPHONE ENCOUNTER
Date:18  RE: Belgica Boyd  : 1985  Estimated Date of Delivery: 18  EGA: 30w0d  OB/GYN: Db Matthews     Insulin controlled GDM and Hypothyroidism         Date Fasting Post-  breakfast Post-  lunch Post-  dinner Comments    92 133 145 116    6/7 85 115 137 125    6/8 78 114 110 146    6/9 89 121 117 114    /10 83 118 112 135    / 80 120 76+ 101     81 124 118 141        + 2 hour pp    Current regimen:  Levemir-26 units at 0930                10 units at 2130  1700 calorie gestational diabetes diet with 3 meals/snacks including protein  Self monitoring blood glucose (SMBG)  fasting and 1 hour after meals with Accu-chek Guide meter  Not interested in Metformin at this time       Plan:   Levemir- increase from 26 to 30 units at 0930                 continue 10 units at 2130  Continue SMBG and diet  Advised to avoid pancakes & dessert  Advised patient to eat a small breakfast sandwich only at breakfast   18 A1C 4 9%, TSH 0 771, free T4 1 25  Hypothyroidism on 100 mcg daily  Repeat TSH, free T4 and A1c on 18     Keep dietician follow-up appointment for 6/15/18        Date due to report next:  Friday, 6/15/18    Transcribed by: Maria L Fontana blood sugar login via [address]  Diabetes Educator   Diabetes and Pregnancy Program

## 2018-06-15 ENCOUNTER — ROUTINE PRENATAL (OUTPATIENT)
Dept: OBGYN CLINIC | Facility: CLINIC | Age: 33
End: 2018-06-15

## 2018-06-15 ENCOUNTER — TELEPHONE (OUTPATIENT)
Dept: PERINATAL CARE | Facility: CLINIC | Age: 33
End: 2018-06-15

## 2018-06-15 ENCOUNTER — OFFICE VISIT (OUTPATIENT)
Dept: PERINATAL CARE | Facility: CLINIC | Age: 33
End: 2018-06-15
Payer: COMMERCIAL

## 2018-06-15 VITALS
WEIGHT: 145.6 LBS | HEART RATE: 83 BPM | HEIGHT: 60 IN | SYSTOLIC BLOOD PRESSURE: 117 MMHG | DIASTOLIC BLOOD PRESSURE: 79 MMHG | BODY MASS INDEX: 28.58 KG/M2

## 2018-06-15 VITALS — BODY MASS INDEX: 28.4 KG/M2 | WEIGHT: 145.4 LBS | DIASTOLIC BLOOD PRESSURE: 84 MMHG | SYSTOLIC BLOOD PRESSURE: 120 MMHG

## 2018-06-15 DIAGNOSIS — Q51.28 PREGNANCY WITH CONGENITAL DUPLICATION OF UTERUS IN FIRST TRIMESTER: ICD-10-CM

## 2018-06-15 DIAGNOSIS — E03.9 HYPOTHYROIDISM, UNSPECIFIED TYPE: ICD-10-CM

## 2018-06-15 DIAGNOSIS — Z34.83 PRENATAL CARE, SUBSEQUENT PREGNANCY, THIRD TRIMESTER: Primary | ICD-10-CM

## 2018-06-15 DIAGNOSIS — O24.414 INSULIN CONTROLLED GESTATIONAL DIABETES MELLITUS (GDM) IN THIRD TRIMESTER: Primary | ICD-10-CM

## 2018-06-15 DIAGNOSIS — O34.591 PREGNANCY WITH CONGENITAL DUPLICATION OF UTERUS IN FIRST TRIMESTER: ICD-10-CM

## 2018-06-15 DIAGNOSIS — O24.410 DIET CONTROLLED GESTATIONAL DIABETES MELLITUS (GDM) IN THIRD TRIMESTER: Primary | ICD-10-CM

## 2018-06-15 PROCEDURE — PNV: Performed by: OBSTETRICS & GYNECOLOGY

## 2018-06-15 PROCEDURE — G0108 DIAB MANAGE TRN  PER INDIV: HCPCS | Performed by: DIETITIAN, REGISTERED

## 2018-06-15 NOTE — TELEPHONE ENCOUNTER
Note      Date:  18  RE: Kim Ruiz    : 1985  Estimated Date of Delivery: 18  EGA: 30w2d  OB/GYN: Josey Valles     Insulin controlled GDM and Hypothyroidism & Gluten Intolerance         Date Fasting Post-  breakfast Post-  lunch Post-  dinner Comments   18 73 127 161 151    18 81 134 138 96    6/15/18 75 113 131           Current regimen:  Levemir-30 units at 0930                 10 units at 2130  1700 calorie gestational diabetes diet with 3 meals/snacks including protein  Self monitoring blood glucose (SMBG)  fasting and 1 hour after meals with Accu-chek Guide meter  Not interested in Metformin at this time       Plan:   Levemir- continue 30 units at 0930                 continue 10 units at 2130  Continue SMBG and diet  Advised patient to eat a small breakfast sandwich only at breakfast with gluten free products  Does best with gluten free whole grains or products  Reports much difficulty following her meal plan when travels for her job  Advised she eats 30 gm CHO (2 servings) at lunch or dinner, then must increase to 4 oz protein  18 A1C 4 9%, TSH 0 771, free T4 1 25  Hypothyroidism on 100 mcg daily    Repeat TSH, free T4 and A1c on 18        Date due to report next:  Wednesday, 18    Starr Rice RD CDE  Diabetes Educator   Diabetes [facility] Pregnancy Program

## 2018-06-15 NOTE — PROGRESS NOTES
Patient still breech, she is on insulin, we now talked about the possibility of doing a primary  section approximately 38 weeks gestation  She will continue to follow with Maternal-Fetal Medicine  Sooner set up for fetal surveillance

## 2018-06-15 NOTE — PROGRESS NOTES
DATE: 06/15/18   RE: Justin Perkins   : 1985  JACKIE: Estimated Date of Delivery: 18  EGA:  07T9C    Dear Dr Akilah Sifuentes: Thank you for referring your patient to the Diabetes and Pregnancy Program at [address]  The patient was seen today for medical nutrition therapy for the treatment of gestational diabetes during pregnancy  In addition to diabetes, the nutrition status is complicated by gluten intolerance  The following was reviewed with the patient:      Weight gain during in pregnancy  Based on the patients height of 5' (1 524 m) inches, pre-pregnancy weight of 122 pounds (BMI 23 8), we would recommend a total weight gain of 25-35 pounds for the pregnancy  o The patients current weight is 66 kg (145 lb 9 6 oz) pounds, and her weight gain to date is 23 pounds   Basic review of macronutrients   Meal pattern should consist of three small meals and three snacks daily   Carbohydrate gram amounts per meal    Appropriate serving size of foods   Incorporating protein at each meal and snack in the importance of protein in relationship to blood glucose control   Individualized meal plan: 1700 gestational diabetes diet  Using many processed gluten free products  She noticed she does best with 30 gm (2 CHO servings) rather than 45 gm CHO (3 servings) at both lunch & dinner  From diet recall, she has better results if uses whole grain gluten free procducts  Advised her to use gluten free whole grains such as quinoa or millet as much as possible  Has been at 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 & had to take all her own food as not able to eat the camp food  Stated she has most difficulty with the meal plan when travels for her job  Diet recall indicates she is following the diet fairly well   Report blood glucose levels to [address] weekly or as directed  o Phone: [phone]   If no response in 24 hours, call [phone]   o Fax: [phone]  o Email: blood  [email or web address]   Follow up: As needed    Thank you for the opportunity to participate in the care of this patient  I can be reached at [phone] should you have any questions  Time spent with patient 3:35-4:05 PM; time spent face to face counseling greater than 50% of the appointment      Sincerely,     Karely Ryder  Diabetes Educator  Diabetes and Pregnancy Program

## 2018-06-20 ENCOUNTER — TELEPHONE (OUTPATIENT)
Dept: PERINATAL CARE | Facility: CLINIC | Age: 33
End: 2018-06-20

## 2018-06-20 DIAGNOSIS — Q51.28 PREGNANCY WITH CONGENITAL DUPLICATION OF UTERUS IN FIRST TRIMESTER: ICD-10-CM

## 2018-06-20 DIAGNOSIS — O34.591 PREGNANCY WITH CONGENITAL DUPLICATION OF UTERUS IN FIRST TRIMESTER: ICD-10-CM

## 2018-06-20 NOTE — TELEPHONE ENCOUNTER
Note      Date:  18  RE: Radha Irizarry  : 1985  Estimated Date of Delivery: 18  EGA: 31w0d  OB/GYN: Aram Colón     Insulin controlled GDM and Hypothyroidism & Gluten Intolerance         Date Fasting Post-  breakfast Post-  lunch Post-  dinner Comments   6/15    99      77 120 113 126      82 135 130 155     76 112 130 148     84 131 133 105                           Current regimen:  Levemir-30 units at 0930                 10 units at 2130  1700 calorie gestational diabetes diet with 3 meals/snacks including protein  Self monitoring blood glucose (SMBG)  fasting and 1 hour after meals with Accu-chek Guide meter  Not interested in Metformin at this time       Plan:   Levemir- continue 30 units at 0930                 continue 10 units at 2130  For now, no changes until review of next log, may consider adding bolus insulin or increasing am Levemir dose  Continue SMBG and diet  Advised patient to eat a small breakfast sandwich only at breakfast with gluten free products  Does best with gluten free whole grains or products  Reports much difficulty following her meal plan when travels for her job  Advised she eats 30 gm CHO (2 servings) at lunch or dinner, then must increase to 4 oz protein  18 A1C 4 9%, TSH 0 771, free T4 1 25  Hypothyroidism on 100 mcg daily    Repeat TSH, free T4 and A1c on 18        Date due to report next:  Monday, 18    Transcribed by Faraz Fernando Blood sugars login via e-mail    MARIYA Dykes  Diabetes Educator   Diabetes [facility] Pregnancy Program

## 2018-06-21 ENCOUNTER — TELEPHONE (OUTPATIENT)
Dept: OBGYN CLINIC | Facility: CLINIC | Age: 33
End: 2018-06-21

## 2018-06-25 ENCOUNTER — APPOINTMENT (OUTPATIENT)
Dept: LAB | Facility: HOSPITAL | Age: 33
End: 2018-06-25
Payer: COMMERCIAL

## 2018-06-25 DIAGNOSIS — O24.414 INSULIN CONTROLLED GESTATIONAL DIABETES MELLITUS (GDM) IN THIRD TRIMESTER: ICD-10-CM

## 2018-06-25 LAB
EST. AVERAGE GLUCOSE BLD GHB EST-MCNC: 94 MG/DL
HBA1C MFR BLD: 4.9 % (ref 4.2–6.3)
T4 FREE SERPL-MCNC: 1.04 NG/DL (ref 0.76–1.46)
TSH SERPL DL<=0.05 MIU/L-ACNC: 0.93 UIU/ML (ref 0.36–3.74)

## 2018-06-25 PROCEDURE — 36415 COLL VENOUS BLD VENIPUNCTURE: CPT

## 2018-06-25 PROCEDURE — 83036 HEMOGLOBIN GLYCOSYLATED A1C: CPT

## 2018-06-25 PROCEDURE — 84439 ASSAY OF FREE THYROXINE: CPT

## 2018-06-25 PROCEDURE — 84443 ASSAY THYROID STIM HORMONE: CPT

## 2018-06-26 ENCOUNTER — TELEPHONE (OUTPATIENT)
Dept: PERINATAL CARE | Facility: CLINIC | Age: 33
End: 2018-06-26

## 2018-06-26 DIAGNOSIS — O34.591 PREGNANCY WITH CONGENITAL DUPLICATION OF UTERUS IN FIRST TRIMESTER: ICD-10-CM

## 2018-06-26 DIAGNOSIS — Q51.28 PREGNANCY WITH CONGENITAL DUPLICATION OF UTERUS IN FIRST TRIMESTER: ICD-10-CM

## 2018-06-26 NOTE — TELEPHONE ENCOUNTER
Note      Date:  18  RE: Evy Stanton  : 1985  Estimated Date of Delivery: 18  EGA: 32w0d  OB/GYN: Vincenzo Bhakta     Insulin controlled GDM and Hypothyroidism & Gluten Intolerance         Date Fasting Post-  breakfast Post-  lunch Post-  dinner Comments    77 128 130 163  vacation    81 151 100 148      85 141 161 134  late fries with a salad    83 134 141 118      89 118 138 144      81 117  126  121      82 124 123 107     78                 Current regimen:  Levemir-30 units at 0930                 10 units at 2130  1700 calorie gestational diabetes diet with 3 meals/snacks including protein  Self monitoring blood glucose (SMBG)  fasting and 1 hour after meals with Accu-chek Guide meter  Not interested in Metformin at this time       Plan:   Levemir- continue 30 units at 0930                 continue 10 units at 2130  For now, no changes until review of next log, may consider adding bolus insulin or increasing am Levemir dose  Continue SMBG and diet   Advised patient to eat a small breakfast sandwich only at breakfast with gluten free products  Does best with gluten free whole grains or products  Reports much difficulty following her meal plan when travels for her job  Advised she eats 30 gm CHO (2 servings) at lunch or dinner, then must increase to 4 oz protein  18 A1C 4 9%, TSH 0 771, free T4 1 25  Hypothyroidism on 100 mcg daily  Repeat TSH, free T4 and A1c on 18 Addendum: glucose readings updated, pending fetal growth US on 18, will consider adding bolus insulin to regimen       Date due to report next:  Friday, 18     Felipe Griffiths Blood sugars login via e-mail    MARIYA Barnes  Diabetes Educator   Diabetes and Pregnancy Program

## 2018-06-29 ENCOUNTER — ULTRASOUND (OUTPATIENT)
Dept: PERINATAL CARE | Facility: CLINIC | Age: 33
End: 2018-06-29
Payer: COMMERCIAL

## 2018-06-29 ENCOUNTER — ROUTINE PRENATAL (OUTPATIENT)
Dept: OBGYN CLINIC | Facility: CLINIC | Age: 33
End: 2018-06-29

## 2018-06-29 ENCOUNTER — DOCUMENTATION (OUTPATIENT)
Dept: PERINATAL CARE | Facility: CLINIC | Age: 33
End: 2018-06-29

## 2018-06-29 VITALS
WEIGHT: 148.4 LBS | HEIGHT: 60 IN | SYSTOLIC BLOOD PRESSURE: 116 MMHG | BODY MASS INDEX: 29.13 KG/M2 | DIASTOLIC BLOOD PRESSURE: 79 MMHG | HEART RATE: 83 BPM

## 2018-06-29 VITALS — SYSTOLIC BLOOD PRESSURE: 112 MMHG | DIASTOLIC BLOOD PRESSURE: 78 MMHG | WEIGHT: 148 LBS | BODY MASS INDEX: 28.9 KG/M2

## 2018-06-29 DIAGNOSIS — O24.414 INSULIN CONTROLLED GESTATIONAL DIABETES MELLITUS (GDM) IN SECOND TRIMESTER: ICD-10-CM

## 2018-06-29 DIAGNOSIS — O24.414 INSULIN CONTROLLED GESTATIONAL DIABETES MELLITUS (GDM) IN SECOND TRIMESTER: Primary | ICD-10-CM

## 2018-06-29 DIAGNOSIS — O34.593 PREGNANCY WITH CONGENITAL DUPLICATION OF UTERUS IN THIRD TRIMESTER: ICD-10-CM

## 2018-06-29 DIAGNOSIS — O44.43 LOW LYING PLACENTA NOS OR WITHOUT HEMORRHAGE, THIRD TRIMESTER: Primary | ICD-10-CM

## 2018-06-29 DIAGNOSIS — Z3A.32 32 WEEKS GESTATION OF PREGNANCY: ICD-10-CM

## 2018-06-29 DIAGNOSIS — E03.9 HYPOTHYROIDISM, UNSPECIFIED TYPE: ICD-10-CM

## 2018-06-29 DIAGNOSIS — O09.813 PREGNANCY RESULTING FROM IN VITRO FERTILIZATION IN THIRD TRIMESTER: ICD-10-CM

## 2018-06-29 DIAGNOSIS — Z34.83 PRENATAL CARE, SUBSEQUENT PREGNANCY, THIRD TRIMESTER: Primary | ICD-10-CM

## 2018-06-29 DIAGNOSIS — Q51.28 PREGNANCY WITH CONGENITAL DUPLICATION OF UTERUS IN THIRD TRIMESTER: ICD-10-CM

## 2018-06-29 PROBLEM — O44.02 PLACENTA PREVIA IN SECOND TRIMESTER: Status: RESOLVED | Noted: 2018-04-07 | Resolved: 2018-06-29

## 2018-06-29 PROCEDURE — 76816 OB US FOLLOW-UP PER FETUS: CPT | Performed by: OBSTETRICS & GYNECOLOGY

## 2018-06-29 PROCEDURE — 99212 OFFICE O/P EST SF 10 MIN: CPT | Performed by: OBSTETRICS & GYNECOLOGY

## 2018-06-29 PROCEDURE — 76817 TRANSVAGINAL US OBSTETRIC: CPT | Performed by: OBSTETRICS & GYNECOLOGY

## 2018-06-29 PROCEDURE — 59025 FETAL NON-STRESS TEST: CPT | Performed by: OBSTETRICS & GYNECOLOGY

## 2018-06-29 PROCEDURE — PNV: Performed by: NURSE PRACTITIONER

## 2018-06-29 NOTE — PROGRESS NOTES
Note      Date:  18  RE: Gin Peck  : 1985  Estimated Date of Delivery: 18  EGA: 32w2d  OB/GYN: Markus Lee     Insulin controlled GDM and Hypothyroidism & Gluten Intolerance         Date Fasting Post-  breakfast Post-  lunch Post-  dinner Comments     145 125 121     86 134 135 170     88 112                                    Current regimen:  Levemir-30 units at 0930                 10 units at 2130  1700 calorie gestational diabetes diet with 3 meals/snacks including protein  Self monitoring blood glucose (SMBG)  fasting and 1 hour after meals with Accu-chek Guide meter  Not interested in Metformin at this time       Plan:   Levemir- decrease from 30 to 20 units at 0930 am                  KNDKMEDO 10 units at 2130 pm  Due to pp >135 start Humalog 4 units before breakfast, lunch and dinner, inject 10 to 15 minutes before meals  Continue SMBG and diet  Have glucose available for hypoglycemia, use 15 by 15 rule  18 A1C 4 9%, TSH 0 927, free T4 1 04  Hypothyroidism on 100 mcg daily  18 fetal growth US appeared normal    NST twice a week and MERARI weekly       Date due to report next:  Tuesday, 18     MARIYA Handy  Diabetes Educator   Diabetes and Pregnancy Program

## 2018-06-29 NOTE — PROGRESS NOTES
A transvaginal ultrasound was performed   Sonographer note on use of High Level Disinfection Process (Trophon) for transvaginal probe# 5 used, serial # [id number]  Patricia Fried RDMS

## 2018-07-03 ENCOUNTER — TELEPHONE (OUTPATIENT)
Dept: PERINATAL CARE | Facility: CLINIC | Age: 33
End: 2018-07-03

## 2018-07-03 ENCOUNTER — ROUTINE PRENATAL (OUTPATIENT)
Dept: PERINATAL CARE | Facility: CLINIC | Age: 33
End: 2018-07-03
Payer: COMMERCIAL

## 2018-07-03 VITALS
HEART RATE: 80 BPM | WEIGHT: 149.6 LBS | SYSTOLIC BLOOD PRESSURE: 117 MMHG | HEIGHT: 60 IN | BODY MASS INDEX: 29.37 KG/M2 | DIASTOLIC BLOOD PRESSURE: 78 MMHG

## 2018-07-03 DIAGNOSIS — O24.414 INSULIN CONTROLLED GESTATIONAL DIABETES MELLITUS (GDM) IN SECOND TRIMESTER: Primary | ICD-10-CM

## 2018-07-03 DIAGNOSIS — Z3A.12 12 WEEKS GESTATION OF PREGNANCY: ICD-10-CM

## 2018-07-03 DIAGNOSIS — O24.419 GESTATIONAL DIABETES MELLITUS (GDM) IN FIRST TRIMESTER, GESTATIONAL DIABETES METHOD OF CONTROL UNSPECIFIED: ICD-10-CM

## 2018-07-03 DIAGNOSIS — Q51.28 PREGNANCY WITH CONGENITAL DUPLICATION OF UTERUS IN FIRST TRIMESTER: ICD-10-CM

## 2018-07-03 DIAGNOSIS — O34.591 PREGNANCY WITH CONGENITAL DUPLICATION OF UTERUS IN FIRST TRIMESTER: ICD-10-CM

## 2018-07-03 DIAGNOSIS — Z3A.32 32 WEEKS GESTATION OF PREGNANCY: ICD-10-CM

## 2018-07-03 PROCEDURE — 59025 FETAL NON-STRESS TEST: CPT | Performed by: OBSTETRICS & GYNECOLOGY

## 2018-07-03 RX ORDER — LANCETS
EACH MISCELLANEOUS
Qty: 102 EACH | Refills: 1 | Status: SHIPPED | OUTPATIENT
Start: 2018-07-03

## 2018-07-03 NOTE — PROGRESS NOTES
The patient had a nonstress test in the office  I have reviewed the nonstress test and agree with the documentation

## 2018-07-03 NOTE — TELEPHONE ENCOUNTER
Date:  18  RE: Eleno Farrell    : 1985  Estimated Date of Delivery: 18  EGA: 32w6d  OB/GYN: Rick Kevin      Date Fasting Post-  breakfast Post-  lunch Post-  dinner Before bedtime Carbs Comments   6-29   117 143      6-30 73 141 161 143      7-1 80 110 103 151      7-2 86 108 97 135                                        Current regimen:  Levemir-30 units at 0930                 10 units at 2130  1700 calorie gestational diabetes diet with 3 meals/snacks including protein  Self monitoring blood glucose (SMBG)  fasting and 1 hour after meals with Accu-chek Guide meter  Not interested in Metformin at this time  Plan:  Levemir- increase to 38 units at 0930      continue 10 units at 2130  Continue diet and SMBG       Date due to report next:  Friday, 18    Josselin Ovalle RN  Diabetes Educator   [facility]

## 2018-07-05 ENCOUNTER — TELEPHONE (OUTPATIENT)
Dept: PERINATAL CARE | Facility: CLINIC | Age: 33
End: 2018-07-05

## 2018-07-05 NOTE — TELEPHONE ENCOUNTER
Note      Date:  18  RE: Toni Quintana  : 1985  Estimated Date of Delivery: 18  EGA: 33w1d  OB/GYN: Austyn Forman     Insulin controlled GDM and Hypothyroidism & Gluten Intolerance         Date Fasting Post-  breakfast Post-  lunch Post-  dinner Comments   7/3/18 88 105 142 135    18 81 108 113 105    18 84 113 148                   Current regimen:  Levemir-20 units at 0930                10 units at 2130  Humalog-4 units 3 times daily with meals  1700 calorie gestational diabetes diet with 3 meals/snacks including protein  Self monitoring blood glucose (SMBG)  fasting and 1 hour after meals with Accu-chek Guide meter  Not interested in Metformin at this time  Patient did not implement plan dated 7/3/18      Plan:   Levemir- 20 units at 0930 am                  AWJMDADP 10 units at 2130 pm  Humalog--4 units at breakfast, increase to 5 units at lunch & continue 4 units at dinner  Continue SMBG and diet  Have glucose available for hypoglycemia, use 15 by 15 rule  18 A1C 4 9%, TSH 0 927, free T4 1 04  Hypothyroidism on 100 mcg daily  18 fetal growth US appeared normal    NST twice a week and MERARI weekly       Date due to report next:  Monday, 18     Vishnu Scott MS, RD, CDE  Diabetes Educator   [facility]

## 2018-07-06 ENCOUNTER — ROUTINE PRENATAL (OUTPATIENT)
Dept: PERINATAL CARE | Facility: CLINIC | Age: 33
End: 2018-07-06
Payer: COMMERCIAL

## 2018-07-06 VITALS
SYSTOLIC BLOOD PRESSURE: 113 MMHG | HEART RATE: 79 BPM | BODY MASS INDEX: 29.31 KG/M2 | WEIGHT: 149.3 LBS | DIASTOLIC BLOOD PRESSURE: 76 MMHG | HEIGHT: 60 IN

## 2018-07-06 DIAGNOSIS — Z3A.33 33 WEEKS GESTATION OF PREGNANCY: ICD-10-CM

## 2018-07-06 DIAGNOSIS — O24.414 INSULIN CONTROLLED GESTATIONAL DIABETES MELLITUS (GDM) IN SECOND TRIMESTER: Primary | ICD-10-CM

## 2018-07-06 PROCEDURE — 76815 OB US LIMITED FETUS(S): CPT | Performed by: OBSTETRICS & GYNECOLOGY

## 2018-07-06 PROCEDURE — 59025 FETAL NON-STRESS TEST: CPT | Performed by: OBSTETRICS & GYNECOLOGY

## 2018-07-08 DIAGNOSIS — O99.282 HYPOTHYROIDISM AFFECTING PREGNANCY IN SECOND TRIMESTER: ICD-10-CM

## 2018-07-08 DIAGNOSIS — E03.9 HYPOTHYROIDISM AFFECTING PREGNANCY IN SECOND TRIMESTER: ICD-10-CM

## 2018-07-10 ENCOUNTER — ROUTINE PRENATAL (OUTPATIENT)
Dept: PERINATAL CARE | Facility: CLINIC | Age: 33
End: 2018-07-10
Payer: COMMERCIAL

## 2018-07-10 VITALS
HEIGHT: 60 IN | WEIGHT: 150.2 LBS | HEART RATE: 80 BPM | BODY MASS INDEX: 29.49 KG/M2 | DIASTOLIC BLOOD PRESSURE: 77 MMHG | SYSTOLIC BLOOD PRESSURE: 116 MMHG

## 2018-07-10 DIAGNOSIS — O24.414 INSULIN CONTROLLED GESTATIONAL DIABETES MELLITUS (GDM) IN SECOND TRIMESTER: Primary | ICD-10-CM

## 2018-07-10 DIAGNOSIS — Z3A.34 34 WEEKS GESTATION OF PREGNANCY: ICD-10-CM

## 2018-07-10 PROCEDURE — 59025 FETAL NON-STRESS TEST: CPT | Performed by: OBSTETRICS & GYNECOLOGY

## 2018-07-10 RX ORDER — LEVOTHYROXINE SODIUM 0.1 MG/1
TABLET ORAL
Qty: 30 TABLET | Refills: 2 | Status: SHIPPED | OUTPATIENT
Start: 2018-07-10 | End: 2018-11-03 | Stop reason: SDUPTHER

## 2018-07-11 ENCOUNTER — TELEPHONE (OUTPATIENT)
Dept: PERINATAL CARE | Facility: CLINIC | Age: 33
End: 2018-07-11

## 2018-07-11 NOTE — TELEPHONE ENCOUNTER
Note      Date:  18  RE: Tomy Santos  : 1985  Estimated Date of Delivery: 18  EGA: 34w0d  OB/GYN: Duyen Krishnamurthy     Insulin controlled GDM and Hypothyroidism & Gluten Intolerance         Date Fasting Post-  breakfast Post-  lunch Post-  dinner Comments   18 84 113 148 129    18 84 105 108 131    18 79 106 117 130    18 98 141 108 118    18 80 113 95 108    7/10/18 79 126 91 114          Current regimen:  Levemir-20 units at 0930                10 units at 2130  Humalog-4 units at breakfast                  5 units at lunch                  4 units at dinner   1700 calorie gestational diabetes diet with 3 meals/snacks including protein  Self monitoring blood glucose (SMBG)  fasting and 1 hour after meals with Accu-chek Guide meter  Not interested in Metformin at this time       Plan:   Continue the above insulin regimen     Continue SMBG and diet  Have glucose available for hypoglycemia, use 15 by 15 rule  18 A1C 4 9%, TSH 0 927, free T4 1 04  Hypothyroidism on 100 mcg daily  18 fetal growth US appeared normal    NST twice a week and MERARI weekly       Date due to report next:  Tuesday, 18     David Marie, MS, RD, CDE  Diabetes Educator   [facility]

## 2018-07-12 DIAGNOSIS — O24.414 INSULIN CONTROLLED GESTATIONAL DIABETES MELLITUS (GDM) IN SECOND TRIMESTER: ICD-10-CM

## 2018-07-12 RX ORDER — INSULIN DETEMIR 100 [IU]/ML
INJECTION, SOLUTION SUBCUTANEOUS
Qty: 15 ML | Refills: 0 | Status: SHIPPED | OUTPATIENT
Start: 2018-07-12 | End: 2018-08-13 | Stop reason: HOSPADM

## 2018-07-13 ENCOUNTER — ROUTINE PRENATAL (OUTPATIENT)
Dept: OBGYN CLINIC | Facility: CLINIC | Age: 33
End: 2018-07-13

## 2018-07-13 ENCOUNTER — ROUTINE PRENATAL (OUTPATIENT)
Dept: PERINATAL CARE | Facility: CLINIC | Age: 33
End: 2018-07-13
Payer: COMMERCIAL

## 2018-07-13 VITALS
HEART RATE: 93 BPM | SYSTOLIC BLOOD PRESSURE: 117 MMHG | DIASTOLIC BLOOD PRESSURE: 78 MMHG | WEIGHT: 151.2 LBS | BODY MASS INDEX: 29.68 KG/M2 | HEIGHT: 60 IN

## 2018-07-13 VITALS — WEIGHT: 152.2 LBS | SYSTOLIC BLOOD PRESSURE: 112 MMHG | DIASTOLIC BLOOD PRESSURE: 80 MMHG | BODY MASS INDEX: 29.72 KG/M2

## 2018-07-13 DIAGNOSIS — Z3A.34 34 WEEKS GESTATION OF PREGNANCY: ICD-10-CM

## 2018-07-13 DIAGNOSIS — Q51.28 PREGNANCY WITH CONGENITAL DUPLICATION OF UTERUS IN THIRD TRIMESTER: ICD-10-CM

## 2018-07-13 DIAGNOSIS — O24.414 INSULIN CONTROLLED GESTATIONAL DIABETES MELLITUS (GDM) IN THIRD TRIMESTER: Primary | ICD-10-CM

## 2018-07-13 DIAGNOSIS — O34.593 PREGNANCY WITH CONGENITAL DUPLICATION OF UTERUS IN THIRD TRIMESTER: ICD-10-CM

## 2018-07-13 DIAGNOSIS — Z34.83 PRENATAL CARE, SUBSEQUENT PREGNANCY, THIRD TRIMESTER: Primary | ICD-10-CM

## 2018-07-13 PROCEDURE — PNV: Performed by: OBSTETRICS & GYNECOLOGY

## 2018-07-13 PROCEDURE — 76815 OB US LIMITED FETUS(S): CPT | Performed by: OBSTETRICS & GYNECOLOGY

## 2018-07-13 PROCEDURE — 59025 FETAL NON-STRESS TEST: CPT | Performed by: OBSTETRICS & GYNECOLOGY

## 2018-07-13 NOTE — PATIENT INSTRUCTIONS
Scheduled for primary  section at 30 weeks gestation for  for breech presentation, double cervix, low lying placenta, gestational diabetic

## 2018-07-13 NOTE — PROGRESS NOTES
[id number] [facility]: Ms Samia Zhao was seen today at 34w2d for NST (found under the pregnancy episode) which I reviewed the RN assessment and agree, and MERARI (see ultrasound report under OB procedures tab)  Please don't hesitate to contact our office with any concerns or questions    Mile Phipps MD

## 2018-07-13 NOTE — PROGRESS NOTES
Scheduled for primary low-transverse  section on  at 12:30 p m  for low lying posterior placenta, double cervix, breech    Insulin-dependent diabetic

## 2018-07-17 ENCOUNTER — TELEPHONE (OUTPATIENT)
Dept: PERINATAL CARE | Facility: CLINIC | Age: 33
End: 2018-07-17

## 2018-07-17 ENCOUNTER — ROUTINE PRENATAL (OUTPATIENT)
Dept: PERINATAL CARE | Facility: CLINIC | Age: 33
End: 2018-07-17
Payer: COMMERCIAL

## 2018-07-17 VITALS
HEART RATE: 81 BPM | DIASTOLIC BLOOD PRESSURE: 80 MMHG | BODY MASS INDEX: 29.61 KG/M2 | WEIGHT: 150.8 LBS | SYSTOLIC BLOOD PRESSURE: 129 MMHG | HEIGHT: 60 IN

## 2018-07-17 DIAGNOSIS — Z3A.35 35 WEEKS GESTATION OF PREGNANCY: Primary | ICD-10-CM

## 2018-07-17 DIAGNOSIS — O24.414 INSULIN CONTROLLED GESTATIONAL DIABETES MELLITUS (GDM) IN THIRD TRIMESTER: ICD-10-CM

## 2018-07-17 PROCEDURE — 59025 FETAL NON-STRESS TEST: CPT | Performed by: OBSTETRICS & GYNECOLOGY

## 2018-07-17 NOTE — TELEPHONE ENCOUNTER
Date:  18  RE: Kodi Nielsen    : 1985  Estimated Date of Delivery: 18  EGA: 34w6d  OB/GYN: Bettina Villatoro, Hypothyroidism  Date Fasting Post-  breakfast Post-  lunch Post-  dinner Comments   7-11 74 155 142 101    7-12 94 130 95 137    7-13 82 114 137 123    7-14 81 112 111 117    7-15 75 95 82 112    7-16 80 119 106 125    7-17 82           Current regimen:  Levemir-20 units at 0930                10 units at 2130  Humalog-4 units at breakfast                  5 units at lunch                  4 units at dinner   1700 calorie gestational diabetes diet with 3 meals/snacks including protein  Self monitoring blood glucose (SMBG)  fasting and 1 hour after meals with Accu-chek Guide meter  Not interested in Metformin at this time  Plan:  Humalog- increase breakfast to 5 units  Continue Levemir,  lunch and dinner Humalog doses, diet and SMBG   18 A1c 4 9%  TSH 0 927, free T4 1 04, on Levothyroxine 100 mcg daily       Date due to report next:  Friday, 18    Trey Menezes RN  Diabetes Educator   [facility]

## 2018-07-20 ENCOUNTER — ROUTINE PRENATAL (OUTPATIENT)
Dept: OBGYN CLINIC | Facility: CLINIC | Age: 33
End: 2018-07-20

## 2018-07-20 ENCOUNTER — ROUTINE PRENATAL (OUTPATIENT)
Dept: PERINATAL CARE | Facility: CLINIC | Age: 33
End: 2018-07-20
Payer: COMMERCIAL

## 2018-07-20 VITALS
WEIGHT: 150.7 LBS | SYSTOLIC BLOOD PRESSURE: 122 MMHG | DIASTOLIC BLOOD PRESSURE: 85 MMHG | HEART RATE: 90 BPM | BODY MASS INDEX: 29.59 KG/M2 | HEIGHT: 60 IN

## 2018-07-20 VITALS — DIASTOLIC BLOOD PRESSURE: 80 MMHG | SYSTOLIC BLOOD PRESSURE: 120 MMHG | WEIGHT: 152 LBS | BODY MASS INDEX: 29.69 KG/M2

## 2018-07-20 DIAGNOSIS — O24.414 INSULIN CONTROLLED GESTATIONAL DIABETES MELLITUS (GDM) IN THIRD TRIMESTER: ICD-10-CM

## 2018-07-20 DIAGNOSIS — Q51.28 PREGNANCY WITH CONGENITAL DUPLICATION OF UTERUS IN THIRD TRIMESTER: ICD-10-CM

## 2018-07-20 DIAGNOSIS — O34.593 PREGNANCY WITH CONGENITAL DUPLICATION OF UTERUS IN THIRD TRIMESTER: ICD-10-CM

## 2018-07-20 DIAGNOSIS — Z34.83 PRENATAL CARE, SUBSEQUENT PREGNANCY, THIRD TRIMESTER: Primary | ICD-10-CM

## 2018-07-20 DIAGNOSIS — O32.9XX0 MALPRESENTATION BEFORE ONSET OF LABOR, SINGLE OR UNSPECIFIED FETUS: Primary | ICD-10-CM

## 2018-07-20 PROCEDURE — 87653 STREP B DNA AMP PROBE: CPT | Performed by: OBSTETRICS & GYNECOLOGY

## 2018-07-20 PROCEDURE — 76815 OB US LIMITED FETUS(S): CPT | Performed by: OBSTETRICS & GYNECOLOGY

## 2018-07-20 PROCEDURE — 59025 FETAL NON-STRESS TEST: CPT | Performed by: OBSTETRICS & GYNECOLOGY

## 2018-07-20 PROCEDURE — PNV: Performed by: OBSTETRICS & GYNECOLOGY

## 2018-07-20 NOTE — PROGRESS NOTES
[address] [facility]: Ms Treasure Frost was seen today at 35w2d for NST (found under the pregnancy episode) which I reviewed the RN assessment and agree, and MERARI (see ultrasound report under OB procedures tab)  Please don't hesitate to contact our office with any concerns or questions    Criss Lantigua MD

## 2018-07-20 NOTE — PROGRESS NOTES
Patient is doing well  Denies LOF/Bleeding/Cramping  Baby is moving well  She was seen at  center today for NST  Patient is aware that she is scheduled for a  section on Aug  9th at 1230 pm  GBS obtained  Continue fetal kick counts  Return in one week

## 2018-07-22 LAB — GP B STREP DNA SPEC QL NAA+PROBE: NORMAL

## 2018-07-24 ENCOUNTER — DOCUMENTATION (OUTPATIENT)
Dept: PERINATAL CARE | Facility: CLINIC | Age: 33
End: 2018-07-24

## 2018-07-24 ENCOUNTER — TELEPHONE (OUTPATIENT)
Dept: OBGYN CLINIC | Facility: CLINIC | Age: 33
End: 2018-07-24

## 2018-07-24 ENCOUNTER — ROUTINE PRENATAL (OUTPATIENT)
Dept: PERINATAL CARE | Facility: CLINIC | Age: 33
End: 2018-07-24
Payer: COMMERCIAL

## 2018-07-24 VITALS
WEIGHT: 152.6 LBS | HEART RATE: 81 BPM | HEIGHT: 60 IN | DIASTOLIC BLOOD PRESSURE: 80 MMHG | BODY MASS INDEX: 29.96 KG/M2 | SYSTOLIC BLOOD PRESSURE: 130 MMHG

## 2018-07-24 DIAGNOSIS — O34.593 PREGNANCY WITH CONGENITAL DUPLICATION OF UTERUS IN THIRD TRIMESTER: ICD-10-CM

## 2018-07-24 DIAGNOSIS — O24.414 INSULIN CONTROLLED GESTATIONAL DIABETES MELLITUS (GDM) IN THIRD TRIMESTER: ICD-10-CM

## 2018-07-24 DIAGNOSIS — Q51.28 PREGNANCY WITH CONGENITAL DUPLICATION OF UTERUS IN THIRD TRIMESTER: ICD-10-CM

## 2018-07-24 DIAGNOSIS — Z3A.35 35 WEEKS GESTATION OF PREGNANCY: Primary | ICD-10-CM

## 2018-07-24 PROCEDURE — 59025 FETAL NON-STRESS TEST: CPT | Performed by: OBSTETRICS & GYNECOLOGY

## 2018-07-24 NOTE — PROGRESS NOTES
[address] [facility] [address]: Ms Sharif Ho was seen today at 35w6d for NST (found under the pregnancy episode) which I reviewed the RN assessment and agree  Please don't hesitate to contact our office with any concerns or questions    Familia Adams MD

## 2018-07-24 NOTE — TELEPHONE ENCOUNTER
Pt was scheduled for C/S 8/9/18 @ 1:30 pm - rescheduled for 8/10/18 @ 10:00 am with labor room Asia Palacio) - diabetes dept would prefer pt to have earlier time in day for C/S due to gestational diabetes  JSW aware  Pt aware

## 2018-07-24 NOTE — PROGRESS NOTES
Date:  18  RE: Gera Maeyn    : 1985  Estimated Date of Delivery: 18  EGA: 35w6d  OB/GYN: Sarthak Goodson, Hypothyroidism  Date Fasting Post-  breakfast Post-  lunch Post-  dinner Comments     121 116 141     81 114 121 106     81 122 101 98     83 118 133 115     90 116 107 132     84 109 125 131     91 96 121 155     81           Current regimen:  Levemir-20 units at 0930                10 units at 2130  Humalog-5 units at breakfast                  5 units at lunch                  4 units at dinner   1700 calorie gestational diabetes diet with 3 meals/snacks including protein  Self monitoring blood glucose (SMBG)  fasting and 1 hour after meals with Accu-chek Guide meter  Not interested in Metformin at this time  Plan:  Humalog- with next log consider increasing before dinner dose if pp >135  Continue Levemir, Humalog doses, diet and SMBG   18 A1c 4 9%  TSH 0 927, free T4 1 04, on Levothyroxine 100 mcg daily  18 fetal growth US appeared normal, due for repeat on 18       Date due to report next:  Friday, 18    MARIYA Rivero  Diabetes Educator   [facility]

## 2018-07-27 ENCOUNTER — ULTRASOUND (OUTPATIENT)
Dept: PERINATAL CARE | Facility: CLINIC | Age: 33
End: 2018-07-27
Payer: COMMERCIAL

## 2018-07-27 ENCOUNTER — DOCUMENTATION (OUTPATIENT)
Dept: PERINATAL CARE | Facility: CLINIC | Age: 33
End: 2018-07-27

## 2018-07-27 ENCOUNTER — APPOINTMENT (OUTPATIENT)
Dept: PERINATAL CARE | Facility: CLINIC | Age: 33
End: 2018-07-27
Payer: COMMERCIAL

## 2018-07-27 ENCOUNTER — ROUTINE PRENATAL (OUTPATIENT)
Dept: OBGYN CLINIC | Facility: CLINIC | Age: 33
End: 2018-07-27

## 2018-07-27 VITALS — SYSTOLIC BLOOD PRESSURE: 128 MMHG | BODY MASS INDEX: 29.88 KG/M2 | DIASTOLIC BLOOD PRESSURE: 80 MMHG | WEIGHT: 153 LBS

## 2018-07-27 VITALS
DIASTOLIC BLOOD PRESSURE: 88 MMHG | HEART RATE: 88 BPM | HEIGHT: 60 IN | BODY MASS INDEX: 29.88 KG/M2 | SYSTOLIC BLOOD PRESSURE: 134 MMHG | WEIGHT: 152.2 LBS

## 2018-07-27 DIAGNOSIS — Z3A.36 36 WEEKS GESTATION OF PREGNANCY: Primary | ICD-10-CM

## 2018-07-27 DIAGNOSIS — O34.593 PREGNANCY WITH CONGENITAL DUPLICATION OF UTERUS IN THIRD TRIMESTER: ICD-10-CM

## 2018-07-27 DIAGNOSIS — O44.43 LOW LYING PLACENTA NOS OR WITHOUT HEMORRHAGE, THIRD TRIMESTER: ICD-10-CM

## 2018-07-27 DIAGNOSIS — O32.9XX0 MALPRESENTATION BEFORE ONSET OF LABOR, SINGLE OR UNSPECIFIED FETUS: ICD-10-CM

## 2018-07-27 DIAGNOSIS — Q51.28 PREGNANCY WITH CONGENITAL DUPLICATION OF UTERUS IN THIRD TRIMESTER: ICD-10-CM

## 2018-07-27 DIAGNOSIS — Z87.718 HISTORY OF UTERINE ANOMALY: ICD-10-CM

## 2018-07-27 DIAGNOSIS — O24.414 INSULIN CONTROLLED GESTATIONAL DIABETES MELLITUS (GDM) IN THIRD TRIMESTER: ICD-10-CM

## 2018-07-27 DIAGNOSIS — Z34.83 PRENATAL CARE, SUBSEQUENT PREGNANCY, THIRD TRIMESTER: Primary | ICD-10-CM

## 2018-07-27 DIAGNOSIS — E03.9 HYPOTHYROIDISM, UNSPECIFIED TYPE: ICD-10-CM

## 2018-07-27 PROCEDURE — 59025 FETAL NON-STRESS TEST: CPT | Performed by: OBSTETRICS & GYNECOLOGY

## 2018-07-27 PROCEDURE — 76816 OB US FOLLOW-UP PER FETUS: CPT | Performed by: OBSTETRICS & GYNECOLOGY

## 2018-07-27 PROCEDURE — PNV: Performed by: OBSTETRICS & GYNECOLOGY

## 2018-07-27 NOTE — PROGRESS NOTES
Date:  18  RE: Kennedy Marie    : 1985  Estimated Date of Delivery: 18  EGA: 36w2d  OB/GYN: Alana Crabtree, Hypothyroidism  Date Fasting Post-  breakfast Post-  lunch Post-  dinner Comments     94 122 138     94 135 127 139     79 112 121 150     78                   Current regimen:  Levemir-20 units at 0930                10 units at 2130  Humalog-5 units at breakfast                  5 units at lunch                  4 units at dinner   1700 calorie gestational diabetes diet with 3 meals/snacks including protein  Self monitoring blood glucose (SMBG)  fasting and 1 hour after meals with Accu-chek Guide meter  Not interested in Metformin at this time  Plan:  Humalog- pp >135 post dinner, increase Humalog from 4 to 6 units before dinner  Continue Levemir, diet and SMBG   18 A1c 4 9%  TSH 0 927, free T4 1 04, on Levothyroxine 100 mcg daily  18 fetal growth US appeared normal, due for repeat on 18  Date due to report next:  Report twice a week during fetal surveillance       MARIYA Puentes  Diabetes Educator   [facility]

## 2018-07-27 NOTE — PROGRESS NOTES
A fetal ultrasound was done to estimate fetal weight; a TV scan was done for placenta location  See results under Ob procedures  An NST was done it is reactive

## 2018-07-27 NOTE — PROGRESS NOTES
Patient is doing well  Denies LOF/Bleeding/Cramping  Baby is moving well  C/S changed to August 10th at 10am    MFM report 7/20/18 at 35 3/7 weeks  GDM  Biophysical profile 8/8  MERARI normal  Breech

## 2018-07-31 ENCOUNTER — ROUTINE PRENATAL (OUTPATIENT)
Dept: PERINATAL CARE | Facility: CLINIC | Age: 33
End: 2018-07-31
Payer: COMMERCIAL

## 2018-07-31 VITALS
HEART RATE: 80 BPM | SYSTOLIC BLOOD PRESSURE: 119 MMHG | WEIGHT: 152.6 LBS | DIASTOLIC BLOOD PRESSURE: 81 MMHG | HEIGHT: 60 IN | BODY MASS INDEX: 29.96 KG/M2

## 2018-07-31 DIAGNOSIS — Z3A.37 37 WEEKS GESTATION OF PREGNANCY: Primary | ICD-10-CM

## 2018-07-31 DIAGNOSIS — O24.414 INSULIN CONTROLLED GESTATIONAL DIABETES MELLITUS (GDM) IN THIRD TRIMESTER: ICD-10-CM

## 2018-07-31 PROCEDURE — 59025 FETAL NON-STRESS TEST: CPT | Performed by: OBSTETRICS & GYNECOLOGY

## 2018-08-01 ENCOUNTER — TELEPHONE (OUTPATIENT)
Dept: PERINATAL CARE | Facility: CLINIC | Age: 33
End: 2018-08-01

## 2018-08-01 NOTE — TELEPHONE ENCOUNTER
Date:  18  RE: Kyle Rueda    : 1985  Estimated Date of Delivery: 18  EGA: 37w0d  OB/GYN: Melisa Bojorquez, Hypothyroidism  Date Fasting Post-  breakfast Post-  lunch Post-  dinner Comments   18 78 126 119 117    18 76 104 106 108    18 77 123 90 117    18 80 124 124 119    18 71 108 95 106        Current regimen:  Levemir-20 units at 0930                10 units at 2130  Humalog-5 units at breakfast                  5 units at lunch                  5 units at dinner   1700 calorie gestational diabetes diet with 3 meals/snacks including protein  Self monitoring blood glucose (SMBG)  fasting and 1 hour after meals with Accu-chek Guide meter  Not interested in Metformin at this time  Plan:  Continue Levemir & Humalog at above doses, diet and SMBG   18 A1c 4 9%  TSH 0 927, free T4 1 04, on Levothyroxine 100 mcg daily  18 fetal growth US appeared normal, due for repeat on 18       Date due to report next:  Monday, 18     Rick Villafana  Diabetes Educator   [facility]

## 2018-08-03 ENCOUNTER — ROUTINE PRENATAL (OUTPATIENT)
Dept: PERINATAL CARE | Facility: CLINIC | Age: 33
End: 2018-08-03
Payer: COMMERCIAL

## 2018-08-03 ENCOUNTER — ROUTINE PRENATAL (OUTPATIENT)
Dept: OBGYN CLINIC | Facility: CLINIC | Age: 33
End: 2018-08-03

## 2018-08-03 VITALS
SYSTOLIC BLOOD PRESSURE: 131 MMHG | HEART RATE: 71 BPM | DIASTOLIC BLOOD PRESSURE: 88 MMHG | BODY MASS INDEX: 30.35 KG/M2 | WEIGHT: 154.6 LBS | HEIGHT: 60 IN

## 2018-08-03 VITALS — SYSTOLIC BLOOD PRESSURE: 130 MMHG | BODY MASS INDEX: 30.04 KG/M2 | DIASTOLIC BLOOD PRESSURE: 82 MMHG | WEIGHT: 153.8 LBS

## 2018-08-03 DIAGNOSIS — Q51.28 PREGNANCY WITH CONGENITAL DUPLICATION OF UTERUS IN THIRD TRIMESTER: ICD-10-CM

## 2018-08-03 DIAGNOSIS — O34.593 PREGNANCY WITH CONGENITAL DUPLICATION OF UTERUS IN THIRD TRIMESTER: ICD-10-CM

## 2018-08-03 DIAGNOSIS — Z34.83 PRENATAL CARE, SUBSEQUENT PREGNANCY, THIRD TRIMESTER: Primary | ICD-10-CM

## 2018-08-03 DIAGNOSIS — O24.414 INSULIN CONTROLLED GESTATIONAL DIABETES MELLITUS (GDM) IN THIRD TRIMESTER: Primary | ICD-10-CM

## 2018-08-03 DIAGNOSIS — Z3A.37 37 WEEKS GESTATION OF PREGNANCY: ICD-10-CM

## 2018-08-03 PROCEDURE — 76815 OB US LIMITED FETUS(S): CPT | Performed by: OBSTETRICS & GYNECOLOGY

## 2018-08-03 PROCEDURE — PNV: Performed by: OBSTETRICS & GYNECOLOGY

## 2018-08-03 PROCEDURE — 59025 FETAL NON-STRESS TEST: CPT | Performed by: OBSTETRICS & GYNECOLOGY

## 2018-08-07 ENCOUNTER — ROUTINE PRENATAL (OUTPATIENT)
Dept: PERINATAL CARE | Facility: CLINIC | Age: 33
End: 2018-08-07
Payer: COMMERCIAL

## 2018-08-07 ENCOUNTER — DOCUMENTATION (OUTPATIENT)
Dept: PERINATAL CARE | Facility: CLINIC | Age: 33
End: 2018-08-07

## 2018-08-07 VITALS
DIASTOLIC BLOOD PRESSURE: 86 MMHG | HEART RATE: 71 BPM | HEIGHT: 60 IN | BODY MASS INDEX: 30.31 KG/M2 | WEIGHT: 154.4 LBS | SYSTOLIC BLOOD PRESSURE: 129 MMHG

## 2018-08-07 DIAGNOSIS — O34.593 PREGNANCY WITH CONGENITAL DUPLICATION OF UTERUS IN THIRD TRIMESTER: ICD-10-CM

## 2018-08-07 DIAGNOSIS — Z87.718 HISTORY OF UTERINE ANOMALY: ICD-10-CM

## 2018-08-07 DIAGNOSIS — Z3A.37 37 WEEKS GESTATION OF PREGNANCY: Primary | ICD-10-CM

## 2018-08-07 DIAGNOSIS — Q51.28 PREGNANCY WITH CONGENITAL DUPLICATION OF UTERUS IN THIRD TRIMESTER: ICD-10-CM

## 2018-08-07 DIAGNOSIS — O24.414 INSULIN CONTROLLED GESTATIONAL DIABETES MELLITUS (GDM) IN THIRD TRIMESTER: ICD-10-CM

## 2018-08-07 PROCEDURE — 59025 FETAL NON-STRESS TEST: CPT | Performed by: OBSTETRICS & GYNECOLOGY

## 2018-08-07 NOTE — PROGRESS NOTES
Date:  18  RE: Pawan Pink    : 1985  Estimated Date of Delivery: 18  EGA: 37w6d  OB/GYN: Jesús Dominguez, Hypothyroidism  Date Fasting Post-  breakfast Post-  lunch Post-  dinner Comments    76 101 126 106    8/2 75 78 2H 105 115    8/3 71 121 124 114    /4 70 100 135 145    /5 72 105 121 124    8/6 72 114 92 101    8/7 74           Current regimen:  Levemir-20 units at 0930                 10 units at 2130  Humalog-5 units at breakfast                  5 units at lunch                  5 units at dinner   1700 calorie gestational diabetes diet with 3 meals/snacks including protein  Self monitoring blood glucose (SMBG)  fasting and 1 hour after meals with Accu-chek Guide meter  Not interested in Metformin at this time  Plan:  Continue Levemir & Humalog at above doses, diet and SMBG  Scheduled for  on 8/10/18, recommend on 18 to inject half of Levemir dose and no am dose day of surgery  18 A1c 4 9%  TSH 0 927, free T4 1 04, on Levothyroxine 100 mcg daily     18 fetal growth US appeared normal      Date due to report next:  Thursday, 18     MARIYA Mejía  Diabetes Educator   [facility]

## 2018-08-09 ENCOUNTER — ANESTHESIA EVENT (INPATIENT)
Dept: LABOR AND DELIVERY | Facility: HOSPITAL | Age: 33
End: 2018-08-09
Payer: COMMERCIAL

## 2018-08-09 ENCOUNTER — ROUTINE PRENATAL (OUTPATIENT)
Dept: PERINATAL CARE | Facility: CLINIC | Age: 33
End: 2018-08-09
Payer: COMMERCIAL

## 2018-08-09 VITALS
HEART RATE: 80 BPM | DIASTOLIC BLOOD PRESSURE: 87 MMHG | BODY MASS INDEX: 30.27 KG/M2 | HEIGHT: 60 IN | SYSTOLIC BLOOD PRESSURE: 132 MMHG | WEIGHT: 154.2 LBS

## 2018-08-09 DIAGNOSIS — Q51.28 PREGNANCY WITH CONGENITAL DUPLICATION OF UTERUS IN THIRD TRIMESTER: ICD-10-CM

## 2018-08-09 DIAGNOSIS — O24.414 INSULIN CONTROLLED GESTATIONAL DIABETES MELLITUS (GDM) IN THIRD TRIMESTER: ICD-10-CM

## 2018-08-09 DIAGNOSIS — O34.593 PREGNANCY WITH CONGENITAL DUPLICATION OF UTERUS IN THIRD TRIMESTER: ICD-10-CM

## 2018-08-09 DIAGNOSIS — Z3A.38 38 WEEKS GESTATION OF PREGNANCY: Primary | ICD-10-CM

## 2018-08-09 PROCEDURE — 59025 FETAL NON-STRESS TEST: CPT | Performed by: OBSTETRICS & GYNECOLOGY

## 2018-08-09 PROCEDURE — 76815 OB US LIMITED FETUS(S): CPT | Performed by: OBSTETRICS & GYNECOLOGY

## 2018-08-10 ENCOUNTER — HOSPITAL ENCOUNTER (INPATIENT)
Facility: HOSPITAL | Age: 33
LOS: 3 days | Discharge: HOME | End: 2018-08-13
Attending: OBSTETRICS & GYNECOLOGY | Admitting: OBSTETRICS & GYNECOLOGY
Payer: COMMERCIAL

## 2018-08-10 ENCOUNTER — ANESTHESIA (INPATIENT)
Dept: LABOR AND DELIVERY | Facility: HOSPITAL | Age: 33
End: 2018-08-10
Payer: COMMERCIAL

## 2018-08-10 DIAGNOSIS — Z98.891 S/P CESAREAN SECTION: ICD-10-CM

## 2018-08-10 DIAGNOSIS — G89.18 POST-OP PAIN: Primary | ICD-10-CM

## 2018-08-10 PROBLEM — Z3A.38 38 WEEKS GESTATION OF PREGNANCY: Status: ACTIVE | Noted: 2018-08-10

## 2018-08-10 LAB
ABO GROUP BLD: NORMAL
BASE EXCESS BLDCOV CALC-SCNC: -2.5 MMOL/L (ref 1–9)
BLD GP AB SCN SERPL QL: POSITIVE
BLOOD GROUP ANTIBODIES SERPL: NORMAL
ERYTHROCYTE [DISTWIDTH] IN BLOOD BY AUTOMATED COUNT: 13.1 % (ref 11.6–15.1)
GLUCOSE SERPL-MCNC: 82 MG/DL (ref 65–140)
HCO3 BLDCOV-SCNC: 22.9 MMOL/L (ref 12.2–28.6)
HCT VFR BLD AUTO: 29.3 % (ref 34.8–46.1)
HCT VFR BLD AUTO: 38.7 % (ref 34.8–46.1)
HGB BLD-MCNC: 13.4 G/DL (ref 11.5–15.4)
HGB BLD-MCNC: 9.7 G/DL (ref 11.5–15.4)
MCH RBC QN AUTO: 32.4 PG (ref 26.8–34.3)
MCHC RBC AUTO-ENTMCNC: 34.6 G/DL (ref 31.4–37.4)
MCV RBC AUTO: 94 FL (ref 82–98)
OXYHGB MFR BLDCOV: 79.7 %
PCO2 BLDCOV: 41.7 MM HG (ref 27–43)
PH BLDCOV: 7.36 [PH] (ref 7.19–7.49)
PLATELET # BLD AUTO: 222 THOUSANDS/UL (ref 149–390)
PMV BLD AUTO: 9.2 FL (ref 8.9–12.7)
PO2 BLDCOV: 34.4 MM HG (ref 15–45)
RBC # BLD AUTO: 4.14 MILLION/UL (ref 3.81–5.12)
RH BLD: NEGATIVE
RPR SER QL: NORMAL
SAO2 % BLDCOV: 16.8 ML/DL
SPECIMEN EXPIRATION DATE: NORMAL
WBC # BLD AUTO: 8.18 THOUSAND/UL (ref 4.31–10.16)

## 2018-08-10 PROCEDURE — 86900 BLOOD TYPING SEROLOGIC ABO: CPT | Performed by: OBSTETRICS & GYNECOLOGY

## 2018-08-10 PROCEDURE — 85014 HEMATOCRIT: CPT | Performed by: OBSTETRICS & GYNECOLOGY

## 2018-08-10 PROCEDURE — 86850 RBC ANTIBODY SCREEN: CPT | Performed by: OBSTETRICS & GYNECOLOGY

## 2018-08-10 PROCEDURE — 85027 COMPLETE CBC AUTOMATED: CPT | Performed by: OBSTETRICS & GYNECOLOGY

## 2018-08-10 PROCEDURE — 82948 REAGENT STRIP/BLOOD GLUCOSE: CPT

## 2018-08-10 PROCEDURE — 82805 BLOOD GASES W/O2 SATURATION: CPT | Performed by: OBSTETRICS & GYNECOLOGY

## 2018-08-10 PROCEDURE — 85018 HEMOGLOBIN: CPT | Performed by: OBSTETRICS & GYNECOLOGY

## 2018-08-10 PROCEDURE — 86592 SYPHILIS TEST NON-TREP QUAL: CPT | Performed by: OBSTETRICS & GYNECOLOGY

## 2018-08-10 PROCEDURE — 4A1HXCZ MONITORING OF PRODUCTS OF CONCEPTION, CARDIAC RATE, EXTERNAL APPROACH: ICD-10-PCS | Performed by: OBSTETRICS & GYNECOLOGY

## 2018-08-10 PROCEDURE — 59510 CESAREAN DELIVERY: CPT | Performed by: OBSTETRICS & GYNECOLOGY

## 2018-08-10 PROCEDURE — 86870 RBC ANTIBODY IDENTIFICATION: CPT | Performed by: OBSTETRICS & GYNECOLOGY

## 2018-08-10 PROCEDURE — 86901 BLOOD TYPING SEROLOGIC RH(D): CPT | Performed by: OBSTETRICS & GYNECOLOGY

## 2018-08-10 RX ORDER — DIPHENHYDRAMINE HYDROCHLORIDE 50 MG/ML
25 INJECTION INTRAMUSCULAR; INTRAVENOUS EVERY 6 HOURS PRN
Status: ACTIVE | OUTPATIENT
Start: 2018-08-10 | End: 2018-08-11

## 2018-08-10 RX ORDER — DEXAMETHASONE SODIUM PHOSPHATE 4 MG/ML
8 INJECTION, SOLUTION INTRA-ARTICULAR; INTRALESIONAL; INTRAMUSCULAR; INTRAVENOUS; SOFT TISSUE ONCE AS NEEDED
Status: ACTIVE | OUTPATIENT
Start: 2018-08-10 | End: 2018-08-11

## 2018-08-10 RX ORDER — CALCIUM CARBONATE 200(500)MG
1000 TABLET,CHEWABLE ORAL DAILY PRN
Status: DISCONTINUED | OUTPATIENT
Start: 2018-08-10 | End: 2018-08-13 | Stop reason: HOSPADM

## 2018-08-10 RX ORDER — ALBUTEROL SULFATE 90 UG/1
1 AEROSOL, METERED RESPIRATORY (INHALATION) EVERY 4 HOURS PRN
Status: DISCONTINUED | OUTPATIENT
Start: 2018-08-10 | End: 2018-08-13 | Stop reason: HOSPADM

## 2018-08-10 RX ORDER — METHYLERGONOVINE MALEATE 0.2 MG/ML
INJECTION INTRAVENOUS
Status: COMPLETED
Start: 2018-08-10 | End: 2018-08-10

## 2018-08-10 RX ORDER — ACETAMINOPHEN 325 MG/1
650 TABLET ORAL EVERY 6 HOURS PRN
Status: DISPENSED | OUTPATIENT
Start: 2018-08-10 | End: 2018-08-11

## 2018-08-10 RX ORDER — LEVOTHYROXINE SODIUM 0.1 MG/1
100 TABLET ORAL
Status: DISCONTINUED | OUTPATIENT
Start: 2018-08-11 | End: 2018-08-13 | Stop reason: HOSPADM

## 2018-08-10 RX ORDER — OXYCODONE HYDROCHLORIDE AND ACETAMINOPHEN 5; 325 MG/1; MG/1
1 TABLET ORAL EVERY 4 HOURS PRN
Status: DISCONTINUED | OUTPATIENT
Start: 2018-08-11 | End: 2018-08-11

## 2018-08-10 RX ORDER — ONDANSETRON 2 MG/ML
4 INJECTION INTRAMUSCULAR; INTRAVENOUS ONCE AS NEEDED
Status: COMPLETED | OUTPATIENT
Start: 2018-08-10 | End: 2018-08-10

## 2018-08-10 RX ORDER — NALBUPHINE HCL 10 MG/ML
5 AMPUL (ML) INJECTION
Status: DISPENSED | OUTPATIENT
Start: 2018-08-10 | End: 2018-08-11

## 2018-08-10 RX ORDER — BUPIVACAINE HYDROCHLORIDE 7.5 MG/ML
INJECTION, SOLUTION INTRASPINAL AS NEEDED
Status: DISCONTINUED | OUTPATIENT
Start: 2018-08-10 | End: 2018-08-10 | Stop reason: SURG

## 2018-08-10 RX ORDER — MORPHINE SULFATE 0.5 MG/ML
INJECTION, SOLUTION EPIDURAL; INTRATHECAL; INTRAVENOUS AS NEEDED
Status: DISCONTINUED | OUTPATIENT
Start: 2018-08-10 | End: 2018-08-10 | Stop reason: SURG

## 2018-08-10 RX ORDER — KETOROLAC TROMETHAMINE 30 MG/ML
30 INJECTION, SOLUTION INTRAMUSCULAR; INTRAVENOUS EVERY 6 HOURS
Status: COMPLETED | OUTPATIENT
Start: 2018-08-10 | End: 2018-08-11

## 2018-08-10 RX ORDER — FENTANYL CITRATE/PF 50 MCG/ML
50 SYRINGE (ML) INJECTION
Status: DISCONTINUED | OUTPATIENT
Start: 2018-08-10 | End: 2018-08-10

## 2018-08-10 RX ORDER — OXYTOCIN/RINGER'S LACTATE 30/500 ML
62.5 PLASTIC BAG, INJECTION (ML) INTRAVENOUS CONTINUOUS
Status: DISPENSED | OUTPATIENT
Start: 2018-08-10 | End: 2018-08-10

## 2018-08-10 RX ORDER — METOCLOPRAMIDE HYDROCHLORIDE 5 MG/ML
5 INJECTION INTRAMUSCULAR; INTRAVENOUS EVERY 6 HOURS PRN
Status: DISPENSED | OUTPATIENT
Start: 2018-08-10 | End: 2018-08-11

## 2018-08-10 RX ORDER — OXYCODONE HYDROCHLORIDE AND ACETAMINOPHEN 5; 325 MG/1; MG/1
1 TABLET ORAL EVERY 4 HOURS PRN
Status: DISCONTINUED | OUTPATIENT
Start: 2018-08-10 | End: 2018-08-10

## 2018-08-10 RX ORDER — ONDANSETRON 2 MG/ML
INJECTION INTRAMUSCULAR; INTRAVENOUS AS NEEDED
Status: DISCONTINUED | OUTPATIENT
Start: 2018-08-10 | End: 2018-08-10 | Stop reason: SURG

## 2018-08-10 RX ORDER — IBUPROFEN 600 MG/1
600 TABLET ORAL EVERY 6 HOURS PRN
Status: DISCONTINUED | OUTPATIENT
Start: 2018-08-10 | End: 2018-08-11

## 2018-08-10 RX ORDER — DOCUSATE SODIUM 100 MG/1
100 CAPSULE, LIQUID FILLED ORAL 2 TIMES DAILY
Status: DISCONTINUED | OUTPATIENT
Start: 2018-08-10 | End: 2018-08-13 | Stop reason: HOSPADM

## 2018-08-10 RX ORDER — OXYCODONE HYDROCHLORIDE AND ACETAMINOPHEN 5; 325 MG/1; MG/1
2 TABLET ORAL EVERY 4 HOURS PRN
Status: DISCONTINUED | OUTPATIENT
Start: 2018-08-10 | End: 2018-08-10

## 2018-08-10 RX ORDER — DEXAMETHASONE SODIUM PHOSPHATE 4 MG/ML
INJECTION, SOLUTION INTRA-ARTICULAR; INTRALESIONAL; INTRAMUSCULAR; INTRAVENOUS; SOFT TISSUE AS NEEDED
Status: DISCONTINUED | OUTPATIENT
Start: 2018-08-10 | End: 2018-08-10 | Stop reason: SURG

## 2018-08-10 RX ORDER — PROMETHAZINE HYDROCHLORIDE 25 MG/ML
12.5 INJECTION, SOLUTION INTRAMUSCULAR; INTRAVENOUS ONCE
Status: COMPLETED | OUTPATIENT
Start: 2018-08-10 | End: 2018-08-10

## 2018-08-10 RX ORDER — METHYLERGONOVINE MALEATE 0.2 MG/ML
0.2 INJECTION INTRAVENOUS ONCE
Status: COMPLETED | OUTPATIENT
Start: 2018-08-10 | End: 2018-08-10

## 2018-08-10 RX ORDER — SIMETHICONE 80 MG
80 TABLET,CHEWABLE ORAL EVERY 6 HOURS PRN
Status: DISCONTINUED | OUTPATIENT
Start: 2018-08-10 | End: 2018-08-13 | Stop reason: HOSPADM

## 2018-08-10 RX ORDER — ACETAMINOPHEN 325 MG/1
650 TABLET ORAL EVERY 6 HOURS PRN
Status: DISCONTINUED | OUTPATIENT
Start: 2018-08-10 | End: 2018-08-11

## 2018-08-10 RX ORDER — ONDANSETRON 2 MG/ML
4 INJECTION INTRAMUSCULAR; INTRAVENOUS EVERY 4 HOURS PRN
Status: DISPENSED | OUTPATIENT
Start: 2018-08-10 | End: 2018-08-11

## 2018-08-10 RX ORDER — OXYTOCIN/RINGER'S LACTATE 30/500 ML
PLASTIC BAG, INJECTION (ML) INTRAVENOUS CONTINUOUS PRN
Status: DISCONTINUED | OUTPATIENT
Start: 2018-08-10 | End: 2018-08-10 | Stop reason: SURG

## 2018-08-10 RX ORDER — DIAPER,BRIEF,INFANT-TODD,DISP
1 EACH MISCELLANEOUS 4 TIMES DAILY PRN
Status: DISCONTINUED | OUTPATIENT
Start: 2018-08-10 | End: 2018-08-13 | Stop reason: HOSPADM

## 2018-08-10 RX ORDER — NALOXONE HYDROCHLORIDE 0.4 MG/ML
0.1 INJECTION, SOLUTION INTRAMUSCULAR; INTRAVENOUS; SUBCUTANEOUS
Status: ACTIVE | OUTPATIENT
Start: 2018-08-10 | End: 2018-08-11

## 2018-08-10 RX ORDER — SODIUM CHLORIDE, SODIUM LACTATE, POTASSIUM CHLORIDE, CALCIUM CHLORIDE 600; 310; 30; 20 MG/100ML; MG/100ML; MG/100ML; MG/100ML
125 INJECTION, SOLUTION INTRAVENOUS CONTINUOUS
Status: DISCONTINUED | OUTPATIENT
Start: 2018-08-10 | End: 2018-08-13 | Stop reason: HOSPADM

## 2018-08-10 RX ORDER — DIPHENHYDRAMINE HCL 25 MG
25 TABLET ORAL EVERY 6 HOURS PRN
Status: DISCONTINUED | OUTPATIENT
Start: 2018-08-10 | End: 2018-08-13 | Stop reason: HOSPADM

## 2018-08-10 RX ORDER — OXYCODONE HYDROCHLORIDE AND ACETAMINOPHEN 5; 325 MG/1; MG/1
2 TABLET ORAL EVERY 4 HOURS PRN
Status: DISCONTINUED | OUTPATIENT
Start: 2018-08-11 | End: 2018-08-11

## 2018-08-10 RX ORDER — FENTANYL CITRATE 50 UG/ML
INJECTION, SOLUTION INTRAMUSCULAR; INTRAVENOUS AS NEEDED
Status: DISCONTINUED | OUTPATIENT
Start: 2018-08-10 | End: 2018-08-10 | Stop reason: SURG

## 2018-08-10 RX ADMIN — KETOROLAC TROMETHAMINE 30 MG: 30 INJECTION, SOLUTION INTRAMUSCULAR at 18:00

## 2018-08-10 RX ADMIN — FENTANYL CITRATE 15 MCG: 50 INJECTION, SOLUTION INTRAMUSCULAR; INTRAVENOUS at 10:14

## 2018-08-10 RX ADMIN — Medication 250 MILLI-UNITS/MIN: at 10:42

## 2018-08-10 RX ADMIN — METHYLERGONOVINE MALEATE 0.2 MG: 0.2 INJECTION, SOLUTION INTRAMUSCULAR; INTRAVENOUS at 11:52

## 2018-08-10 RX ADMIN — SODIUM CHLORIDE, SODIUM LACTATE, POTASSIUM CHLORIDE, AND CALCIUM CHLORIDE 125 ML/HR: .6; .31; .03; .02 INJECTION, SOLUTION INTRAVENOUS at 09:27

## 2018-08-10 RX ADMIN — MORPHINE SULFATE 0.15 MG: 0.5 INJECTION, SOLUTION EPIDURAL; INTRATHECAL; INTRAVENOUS at 10:14

## 2018-08-10 RX ADMIN — METHYLERGONOVINE MALEATE 0.2 MG: 0.2 INJECTION INTRAVENOUS at 11:52

## 2018-08-10 RX ADMIN — KETOROLAC TROMETHAMINE 30 MG: 30 INJECTION, SOLUTION INTRAMUSCULAR at 10:56

## 2018-08-10 RX ADMIN — BUPIVACAINE HYDROCHLORIDE IN DEXTROSE 1.4 ML: 7.5 INJECTION, SOLUTION SUBARACHNOID at 10:14

## 2018-08-10 RX ADMIN — DOCUSATE SODIUM 100 MG: 100 CAPSULE, LIQUID FILLED ORAL at 18:05

## 2018-08-10 RX ADMIN — NALBUPHINE HYDROCHLORIDE 5 MG: 10 INJECTION, SOLUTION INTRAMUSCULAR; INTRAVENOUS; SUBCUTANEOUS at 12:33

## 2018-08-10 RX ADMIN — Medication 62.5 MILLI-UNITS/MIN: at 12:57

## 2018-08-10 RX ADMIN — SODIUM CHLORIDE, SODIUM LACTATE, POTASSIUM CHLORIDE, AND CALCIUM CHLORIDE 125 ML/HR: .6; .31; .03; .02 INJECTION, SOLUTION INTRAVENOUS at 12:57

## 2018-08-10 RX ADMIN — ONDANSETRON 4 MG: 2 INJECTION INTRAMUSCULAR; INTRAVENOUS at 10:45

## 2018-08-10 RX ADMIN — ACETAMINOPHEN 650 MG: 325 TABLET, FILM COATED ORAL at 15:43

## 2018-08-10 RX ADMIN — PHENYLEPHRINE HYDROCHLORIDE 30 MCG/MIN: 10 INJECTION INTRAVENOUS at 10:14

## 2018-08-10 RX ADMIN — SODIUM CHLORIDE, SODIUM LACTATE, POTASSIUM CHLORIDE, AND CALCIUM CHLORIDE 125 ML/HR: .6; .31; .03; .02 INJECTION, SOLUTION INTRAVENOUS at 22:28

## 2018-08-10 RX ADMIN — ONDANSETRON 4 MG: 2 INJECTION, SOLUTION INTRAMUSCULAR; INTRAVENOUS at 11:48

## 2018-08-10 RX ADMIN — SODIUM CHLORIDE, SODIUM LACTATE, POTASSIUM CHLORIDE, AND CALCIUM CHLORIDE 1000 ML: .6; .31; .03; .02 INJECTION, SOLUTION INTRAVENOUS at 08:43

## 2018-08-10 RX ADMIN — CEFAZOLIN SODIUM 2000 MG: 2 SOLUTION INTRAVENOUS at 10:07

## 2018-08-10 RX ADMIN — DEXAMETHASONE SODIUM PHOSPHATE 4 MG: 4 INJECTION, SOLUTION INTRAMUSCULAR; INTRAVENOUS at 10:45

## 2018-08-10 RX ADMIN — METOCLOPRAMIDE 5 MG: 5 INJECTION, SOLUTION INTRAMUSCULAR; INTRAVENOUS at 12:18

## 2018-08-10 RX ADMIN — SODIUM CHLORIDE, SODIUM LACTATE, POTASSIUM CHLORIDE, AND CALCIUM CHLORIDE 125 ML/HR: .6; .31; .03; .02 INJECTION, SOLUTION INTRAVENOUS at 14:39

## 2018-08-10 RX ADMIN — NALBUPHINE HYDROCHLORIDE 5 MG: 10 INJECTION, SOLUTION INTRAMUSCULAR; INTRAVENOUS; SUBCUTANEOUS at 22:28

## 2018-08-10 RX ADMIN — NALBUPHINE HYDROCHLORIDE: 10 INJECTION, SOLUTION INTRAMUSCULAR; INTRAVENOUS; SUBCUTANEOUS at 18:04

## 2018-08-10 RX ADMIN — SODIUM CHLORIDE, SODIUM LACTATE, POTASSIUM CHLORIDE, AND CALCIUM CHLORIDE: .6; .31; .03; .02 INJECTION, SOLUTION INTRAVENOUS at 11:12

## 2018-08-10 RX ADMIN — PROMETHAZINE HYDROCHLORIDE 12.5 MG: 25 INJECTION INTRAMUSCULAR; INTRAVENOUS at 14:16

## 2018-08-10 NOTE — ANESTHESIA PROCEDURE NOTES
Spinal Block    Patient location during procedure: OB  Start time: 8/10/2018 10:10 AM  End time: 8/10/2018 10:14 AM  Reason for block: primary anesthetic  Staffing  Anesthesiologist: Juliette العلي  Resident/CRNA: Deanne Foster  Performed: resident/CRNA   Preanesthetic Checklist  Completed: patient identified, site marked, surgical consent, pre-op evaluation, timeout performed, IV checked, risks and benefits discussed and monitors and equipment checked  Spinal Block  Patient position: sitting  Prep: Betadine  Patient monitoring: heart rate, continuous pulse ox and frequent blood pressure checks  Approach: midline  Location: L3-4  Injection technique: single-shot  Needle  Needle type: pencil-tip   Needle gauge: 25 G  Needle length: 10 cm  Assessment  Injection Assessment:  negative aspiration for heme, no paresthesia on injection and positive aspiration for clear CSF    Post-procedure:  site cleaned

## 2018-08-10 NOTE — ANESTHESIA PREPROCEDURE EVALUATION
27 yo  at 38+2 presenting for scheduled  for breech presentation  Review of Systems/Medical History  Patient summary reviewed  Chart reviewed  History of anesthetic complications PONV    Cardiovascular  Negative cardio ROS    Pulmonary  Asthma , well controlled/ stable Last rescue: < 1 month ago Asthma type of rescue: PRN inhaler,        GI/Hepatic  Negative GI/hepatic ROS          Negative  ROS        Endo/Other  Diabetes well controlled gestational Insulin, History of thyroid disease , hypothyroidism,      GYN  Currently pregnant (breech) , Prior pregnancy/OB history : 4 Parity: 0,     Comment: Female infertility, double cervix - IVF pregnancy     Hematology  Negative hematology ROS      Musculoskeletal  Negative musculoskeletal ROS        Neurology  Negative neurology ROS      Psychology   Depression ,            Physical Exam    Airway    Mallampati score: I  TM Distance: >3 FB  Neck ROM: full     Dental   No notable dental hx     Cardiovascular  Comment: Negative ROS,     Pulmonary      Other Findings      Lab Results   Component Value Date    HGB 13 4 08/10/2018     08/10/2018     Anesthesia Plan  ASA Score- 2     Anesthesia Type- spinal with ASA Monitors  Additional Monitors:   Airway Plan:     Comment: Spinal with duramorph - backup GETA  PONV ppx for hx  Of PONV/opiate-induced nausea  Plan Factors-    Induction-     Postoperative Plan-     Informed Consent- Anesthetic plan and risks discussed with patient and spouse  I personally reviewed this patient with the CRNA  Discussed and agreed on the Anesthesia Plan with the CRNA  Jailyn Echols

## 2018-08-10 NOTE — OP NOTE
OPERATIVE REPORT  PATIENT NAME: Timur Henriquez    :  1985  MRN: [id number]  Pt Location: [facility] L[facility] OR ROOM 02    SURGERY DATE: 8/10/2018    Surgeon(s) and Role:     * Araceli Hinton MD - Primary     * Sharmin Abel MD - Assisting    Preop Diagnosis:  Breech presentation, single or unspecified fetus [O32 1XX0]  IUP at 38w2d  IVF pregnancy  Rh negative  A2GDM  Hypothyroidism  Double Cervix    Post-Op Diagnosis Codes:  Same, delivered    Procedure(s) (LRB):   SECTION () (N/A)    Specimen(s):  ID Type Source Tests Collected by Time Destination   A :  Cord Blood Cord BLOOD GAS, VENOUS, CORD, BLOOD GAS, ARTERIAL, CORD Araceli Hinton MD 8/10/2018 1042    B :  Tissue (Placenta on Hold) OB Only Placenta PLACENTA IN STORAGE Araceli Hinton MD 8/10/2018 1043        Estimated Blood Loss:   1000mL    Drains:  Urethral Catheter Double-lumen 16 Fr  (Active)   Site Assessment Skin intact 8/10/2018 10:19 AM   Collection Container Standard drainage bag 8/10/2018 10:19 AM   Number of days: 0       Anesthesia Type:   Spinal    Operative Indications:  Breech presentation, single or unspecified fetus [O32 1XX0]    Operative Findings:  1  Delivery of viable female on 08/10/18 at 1042, weight 7lbs 0oz;  Apgar scores of 9 at one minute and 9 at five minutes  2  Normal appearing placenta with centrally-inserted 3 vessel cord  3  Clear amniotic fluid  4  Grossly normal uterus (no didelphys or septum), tubes, and ovaries  5  Right fundal fibroid and right posterior serosal fibroid    Specimens:  1  Arterial and venous cord gases  Arterial gas was collected but insufficient to run  Venous pH: 7 358, Base excess: -2 5  2  Cord blood  3  Segment of umbilical cord  4  Placenta to storage     Procedure Details:  Decision was made to proceed with  section due to Breech presentation  Patient was made aware of these findings and the proposed plan   Risks, benefits, possible complications, alternate treatment options, and expected outcomes were discussed with the patient  The patient agreed with the proposed plan and gave informed consent  The patient was taken to the operating room where she was properly identified to the OR staff and attending physician  She received spinal anesthesia by Dr Radha Merida preoperatively  Fetal heart tones were appreciated and found to be appropriate  Pt was placed in dorsal supine position with a left lateral tilt of the hips  A Miller catheter and SCDs were placed  The vagina was prepped with Betadine  The abdomen was prepped with Chloraprep and following appropriate drying time, the patient was draped in the usual sterile manner for a Pfannenstiel incision  The patient had received Ancef 2g IV pre-operatively for prophylaxis  A Time Out was held and the above information confirmed  The patient was identified as Radha Robles and the procedure verified as  Delivery  A Pfannenstiel incision was made and carried down through the underlying subcutaneous tissue to the fascia using a scalpel  Bleeding in the subcutaneous tissue was made hemostatic with Bovie coagulation  Rectus fascia was then incised in the midline and extended laterally using Rico scissors  The superior edge of the fascia was grasped with Kocher clamps, tented upward, and the underlying muscle was dissected off with the sclapel and blunt dissection  The inferior edge was grasped with Kocher clamps and cleared in similar fashion  All anatomic layers were well-demarcated  The rectus muscles were  and the peritoneum was identified and subsequently entered and extended longitudinally with blunt dissection  The vesicouterine peritoneum was identified and a bladder flap was created using Metzenbaum scissors  The bladder blade was inserted  A low transverse uterine incision was made with the scalpel and extended laterally with blunt dissection  The amnion was entered bluntly and noted to be clear  The surgeon's hand was inserted through the hysterotomy and the fetus was noted to be in complete breech presentation, back facing anteriorly  Bilateral fetal legs were grasped and delivered through the hysterotomy up until the level of the scapula  An index finger was slid along the right fetal scapula, over the shoulder, and into the antecubital fossa  The elbow was then swept downward, followed by delivery of the right arm  The trunk was then gently rotated clockwise and the left arm was delivered in similar fashion  The fetal head was then delivered atraumatically  The  had spontaneous cry with good color and tone  The umbilical cord was clamped and cut  The infant was handed off to the  providers  Arterial and venous cord gases, cord blood, and a segment of umbilical cord were obtained for evaluation and promptly sent to the lab  The placenta delivered spontaneously with uterine fundal massage and was noted to have a 3 vessel cord  This was also sent to the lab for storage  The uterus was exteriorized with the findings noted as above  A moist lap sponge was used to clear the cavity of clots and products of conception  The uterine incision was closed with a running locked suture of 0 Vicryl  A second layer of the same suture was used to imbricate the first  Good hemostasis was confirmed upon uterine closure  Warmed normal saline solution was used to irrigate the posterior culdesac and the uterus was returned to the abdomen  The paracolic gutters were inspected and cleared of all clots and debris with irrigation and moist lap sponges  The fascia was closed with a running suture of 0 Vicryl  The Subcutaneous tissue was irrigated with warm saline and closed with interrupted, 3-0 Plain suture  The skin was closed with 4-0 Monocryl in a subcuticular fashion  Histoacryl was applied  Fundal pressure was applied and the uterus expressed of remaining clots   It was noted to be firm at -3cm below the umbilicus  Telfa dressing and an abdominal binder were then placed  At the conclusion of the procedure, all needle, sponge, and instrument counts were noted to be correct x2  The patient tolerated the procedure well and was transferred to her the recovery room in stable condition  Katrin Sanchez MD was present and participated in all key portions of the case      Complications:   None    Patient Disposition:  PACU  and hemodynamically stable   0 2 mg of IM Methergine given as prophylaxis      SIGNATURE: Bon Gracia MD  DATE: August 10, 2018  TIME: 11:39 AM

## 2018-08-10 NOTE — PROGRESS NOTES
[address] [facility]: Ms Aysha Noguera was seen today at 38w2d for NST (found under the pregnancy episode) which I reviewed the RN assessment and agree, and MERARI (see ultrasound report under OB procedures tab)  Please don't hesitate to contact our office with any concerns or questions    Harini Huff MD

## 2018-08-10 NOTE — PROGRESS NOTES
Post Op Check  Radha Robles 28 y o  female MRN: [id number]  Unit/Bed#: -01 Encounter: [id number]    Subjective:  Pt states she is doing okay  She is less nauseous and was able to tolerate some snacks without issue  Pain in controlled  She has been attempting to breastfeed, however, having difficulty with latching and  falling asleep  Per nursing, pt's vaginal bleeding has been fine, no clots  Urine is clearing up in danielle  Denies chest pain, SOB, leg pain  /79 (BP Location: Right arm)   Pulse 74   Temp 98 3 °F (36 8 °C) (Oral)   Resp 18   Ht 5' (1 524 m)   Wt 69 9 kg (154 lb)   LMP 11/15/2017 (Exact Date)   SpO2 98%   Breastfeeding? Yes   BMI 30 08 kg/m²     I/O this shift:  In: 2000 [I V :]  Out: 1200 [Urine:100; Emesis/NG output:100; Blood:1000]    Lab Results   Component Value Date    WBC 8 18 08/10/2018    HGB 13 4 08/10/2018    HCT 38 7 08/10/2018    MCV 94 08/10/2018     08/10/2018       Lab Results   Component Value Date    GLUCOSE 85 2018    CALCIUM 9 4 2018     2018    K 3 9 2018    CO2 27 2018     2018    BUN 11 2018    CREATININE 0 45 (L) 2018       Physical Exam  Gen:Sitting up in bed, attempting to breastfeed baby; NAD  CVS: RRR no m/r/g  Lungs:CTA b/l   Abdomen:Soft, non-tender, non-distended with abdominal binder in place  Uterine fundus firm and appropriately tender -3cm below the umbilicus  Incision: C/D/I with histoacryl  Telfa dressing in place  Extremities: Non-tender, SCDs on and on    A/P:POD #0 s/p 1LTCS for Breech presentation, stable  Rh negative  A2GDM  Hypothyroidism  1)Continue routine post-op care  Danielle in place, making adequate UOP  Remove in AM and f/u voiding trial  Diet:   DVT PPx: SCDs  Encouraged incentive spirometry to reduce atelectasis and pneumonia risk  Encouraged ambulation as tolerated  2) PP bleeding: Intra-op 1000mL, passed 3 small clots in PACU  VSS   Pre-op hgb 13 4--> f/u 1800 H&H  AM CBC to follow     3) Rh negative: RHIG/RhoGAM ordered  4) Hypothyroidism: Continue home synthroid, 100mcg daily  5) A2GDM: f/u 2hr GTT 4-12wk postpartum    Francis Cevallos MD  OBGYN, PGY-2  8/10/2018 4:24 PM

## 2018-08-10 NOTE — DISCHARGE SUMMARY
Discharge Summary - Araceli Guevara 28 y o  female MRN: [id number]    Unit/Bed#: LD PACU-03 Encounter: [id number]    Admission Date: 8/10/2018     Discharge Date: 2018    Admitting Diagnosis:   1  Pregnancy at 38w2d  2  Breech Presentation  3  Double Cervix   4  Rh negative  5  A2GDM, insulin controlled  6  Hypothyroidism  7  Placenta previa that resolved  8  IVF pregnancy    Discharge Diagnosis: same, delivered    Procedures: primary  section, low transverse incision    Attending: Kimberly Massey MD    Hospital Course:     Araceli Guevara is a 28 y o   at 38w2d wks who was initially admitted for primary  section for breech presentation  She delivered a viable female  on 8/10/18 at 1042  Weight 7lbs 0oz via primary  section, low transverse incision  Apgars were 9 (1 min) and 9 (5 min)  Patient tolerated the procedure well and was transferred to recovery in stable condition  Her post-operative course was complicated by an EBL of 1000ml requiring methergine administration  Preoperative hemoglobin was 13 4, postoperative was 7 8  Her vital signs remained stable throughout her admission and her anemia was asymptomatic  Her postoperative pain was well controlled with oral analgesics  On day of discharge, she was ambulating and able to reasonably perform all ADLs  She was voiding and had appropriate bowel function  Pain was well controlled  She was discharged home on post-operative day #3 without complications  Patient was instructed to follow up with her OB as an outpatient and was given appropriate warnings to call provider if she develops signs of infection or uncontrolled pain  Complications: none apparent    Condition at discharge: good      Discharge Medications:   Prenatal vitamin daily for 6 months or the duration of nursing whichever is longer    Motrin 600 mg orally every 6 hours as needed for pain  Tylenol (over the counter) per bottle directions as needed for pain  Hydrocortisone cream 1% (over the counter) applied 1-2x daily to hemorrhoids as needed  Witch hazel pads for hemorrhoidal discomfort as needed    Discharge instructions: See after visit summary for complete information  Do not place anything (no partner, tampons or douche) in your vagina for 6 weeks  You may walk for exercise for the first 6 weeks then gradually return to your usual activities     Please do not drive for 1 week if you have no stitches and for 2 weeks if you have stitches or underwent a  delivery     You may take baths or shower per your preference     Please look at your bust (breasts) in the mirror daily and call for redness or tenderness or increased warmth     If you have had a  please look at your incision daily as well and call us for increasing redness or steady drainage from the incision     Please call us for temperature > 100 4*F or 38* C, worsening pain or a foul discharge      Disposition: Home    Planned Readmission: No

## 2018-08-10 NOTE — DISCHARGE INSTRUCTIONS
Section   WHAT YOU SHOULD KNOW:   A  delivery, or , is abdominal surgery to deliver your baby  There are many reasons you may need a   · A  may be scheduled before labor if you had a  with your last baby  It may be scheduled if your baby is not positioned normally, or you are pregnant with more than 1 baby  · Your caregiver may perform an emergency  during labor to prevent life-threatening complications for you or your baby  A  may be done if your cervix does not dilate after several hours of active labor  · Other reasons for a  include maternal infections and problems with the placenta  AFTER YOU LEAVE:   Medicines:   · Prescription pain medicine  may be given  Ask how to take this medicine safely  · Acetaminophen  decreases pain and fever  It is available without a doctor's order  Ask how much to take and how often to take it  Follow directions  Acetaminophen can cause liver damage if not taken correctly  · NSAIDs  help decrease swelling and pain or fever  This medicine is available with or without a doctor's order  NSAIDs can cause stomach bleeding or kidney problems in certain people  If you take blood thinner medicine, always ask your obstetrician if NSAIDs are safe for you  Always read the medicine label and follow directions  · Take your medicine as directed  Contact your obstetrician (OB) if you think your medicine is not helping or if you have side effects  Tell him if you are allergic to any medicine  Keep a list of the medicines, vitamins, and herbs you take  Include the amounts, and when and why you take them  Bring the list or the pill bottles to follow-up visits  Carry your medicine list with you in case of an emergency  Follow up with your OB as directed: You may need to return to have your stitches or staples removed   Write down your questions so you remember to ask them during your visits  Wound care:  Carefully wash your wound with soap and water every day  Keep your wound clean and dry  Wear loose, comfortable clothes that do not rub against your wound  Ask your OB about bathing and showering  Drink plenty of liquids: You can lower your risk for a blood clot if you drink plenty of liquids  Ask how much liquid to drink each day and which liquids are best for you  Limit activity until you have fully recovered from surgery:   · Ask when it is safe for you to drive, walk up stairs, lift heavy objects, and have sex  · Ask when it is okay to exercise, and what types of exercise to do  Start slowly and do more as you get stronger  Contact your OB if:   · You have heavy vaginal bleeding that fills 1 or more sanitary pads in 1 hour  · You have a fever  · Your incision is swollen, red, or draining pus  · You have questions or concerns about yourself or your baby  Seek care immediately or call 911 if:   · Blood soaks through your bandage  · Your stitches come apart  · You feel lightheaded, short of breath, and have chest pain  · You cough up blood  · Your arm or leg feels warm, tender, and painful  It may look swollen and red  © 2014 [address] is for End User's use only and may not be sold, redistributed or otherwise used for commercial purposes  All illustrations and images included in CareNotes® are the copyrighted property of A D [employer] M [employer]  or [employer]  The above information is an  only  It is not intended as medical advice for individual conditions or treatments  Talk to your doctor, nurse or pharmacist before following any medical regimen to see if it is safe and effective for you

## 2018-08-10 NOTE — ANESTHESIA POSTPROCEDURE EVALUATION
Post-Op Assessment Note      CV Status:  Stable    Mental Status:  Alert and awake    Hydration Status:  Euvolemic    PONV Controlled:  Controlled    Airway Patency:  Patent    Post Op Vitals Reviewed: Yes          Staff: CRNA           BP   90/53   Temp   97 3   Pulse  64   Resp   15   SpO2   96%

## 2018-08-11 LAB
ABO GROUP BLD: NORMAL
BASOPHILS # BLD AUTO: 0.03 THOUSANDS/ΜL (ref 0–0.1)
BASOPHILS NFR BLD AUTO: 0 % (ref 0–1)
BLD GP AB SCN SERPL QL: POSITIVE
EOSINOPHIL # BLD AUTO: 0.01 THOUSAND/ΜL (ref 0–0.61)
EOSINOPHIL NFR BLD AUTO: 0 % (ref 0–6)
ERYTHROCYTE [DISTWIDTH] IN BLOOD BY AUTOMATED COUNT: 13.3 % (ref 11.6–15.1)
FETAL CELL SCN BLD QL ROSETTE: NEGATIVE
HCT VFR BLD AUTO: 23.3 % (ref 34.8–46.1)
HGB BLD-MCNC: 7.8 G/DL (ref 11.5–15.4)
IMM GRANULOCYTES # BLD AUTO: 0.33 THOUSAND/UL (ref 0–0.2)
IMM GRANULOCYTES NFR BLD AUTO: 2 % (ref 0–2)
LYMPHOCYTES # BLD AUTO: 2.29 THOUSANDS/ΜL (ref 0.6–4.47)
LYMPHOCYTES NFR BLD AUTO: 12 % (ref 14–44)
MCH RBC QN AUTO: 32.4 PG (ref 26.8–34.3)
MCHC RBC AUTO-ENTMCNC: 33.5 G/DL (ref 31.4–37.4)
MCV RBC AUTO: 97 FL (ref 82–98)
MONOCYTES # BLD AUTO: 1.65 THOUSAND/ΜL (ref 0.17–1.22)
MONOCYTES NFR BLD AUTO: 8 % (ref 4–12)
NEUTROPHILS # BLD AUTO: 15.52 THOUSANDS/ΜL (ref 1.85–7.62)
NEUTS SEG NFR BLD AUTO: 78 % (ref 43–75)
NRBC BLD AUTO-RTO: 0 /100 WBCS
PLATELET # BLD AUTO: 160 THOUSANDS/UL (ref 149–390)
PMV BLD AUTO: 9.5 FL (ref 8.9–12.7)
RBC # BLD AUTO: 2.41 MILLION/UL (ref 3.81–5.12)
RH BLD: NEGATIVE
WBC # BLD AUTO: 19.83 THOUSAND/UL (ref 4.31–10.16)

## 2018-08-11 PROCEDURE — 86900 BLOOD TYPING SEROLOGIC ABO: CPT | Performed by: OBSTETRICS & GYNECOLOGY

## 2018-08-11 PROCEDURE — 86901 BLOOD TYPING SEROLOGIC RH(D): CPT | Performed by: OBSTETRICS & GYNECOLOGY

## 2018-08-11 PROCEDURE — 85461 HEMOGLOBIN FETAL: CPT | Performed by: OBSTETRICS & GYNECOLOGY

## 2018-08-11 PROCEDURE — 86850 RBC ANTIBODY SCREEN: CPT | Performed by: OBSTETRICS & GYNECOLOGY

## 2018-08-11 PROCEDURE — 85025 COMPLETE CBC W/AUTO DIFF WBC: CPT | Performed by: OBSTETRICS & GYNECOLOGY

## 2018-08-11 RX ORDER — LORATADINE 10 MG/1
10 TABLET ORAL DAILY
Status: DISCONTINUED | OUTPATIENT
Start: 2018-08-11 | End: 2018-08-13 | Stop reason: HOSPADM

## 2018-08-11 RX ORDER — DIAPER,BRIEF,INFANT-TODD,DISP
EACH MISCELLANEOUS 3 TIMES DAILY PRN
Status: DISCONTINUED | OUTPATIENT
Start: 2018-08-11 | End: 2018-08-13 | Stop reason: HOSPADM

## 2018-08-11 RX ORDER — BUTALBITAL, ACETAMINOPHEN AND CAFFEINE 50; 325; 40 MG/1; MG/1; MG/1
1 TABLET ORAL EVERY 4 HOURS PRN
Status: DISCONTINUED | OUTPATIENT
Start: 2018-08-11 | End: 2018-08-13 | Stop reason: HOSPADM

## 2018-08-11 RX ORDER — ACETAMINOPHEN 325 MG/1
650 TABLET ORAL EVERY 6 HOURS PRN
Status: DISCONTINUED | OUTPATIENT
Start: 2018-08-11 | End: 2018-08-13 | Stop reason: HOSPADM

## 2018-08-11 RX ORDER — OXYCODONE HYDROCHLORIDE 5 MG/1
5 TABLET ORAL EVERY 4 HOURS PRN
Status: DISCONTINUED | OUTPATIENT
Start: 2018-08-11 | End: 2018-08-13 | Stop reason: HOSPADM

## 2018-08-11 RX ORDER — OXYCODONE HYDROCHLORIDE 10 MG/1
10 TABLET ORAL EVERY 4 HOURS PRN
Status: DISCONTINUED | OUTPATIENT
Start: 2018-08-11 | End: 2018-08-13 | Stop reason: HOSPADM

## 2018-08-11 RX ADMIN — IBUPROFEN 600 MG: 100 SUSPENSION ORAL at 22:51

## 2018-08-11 RX ADMIN — IBUPROFEN 600 MG: 600 TABLET, FILM COATED ORAL at 15:30

## 2018-08-11 RX ADMIN — Medication 1 TABLET: at 08:12

## 2018-08-11 RX ADMIN — DOCUSATE SODIUM 100 MG: 100 CAPSULE, LIQUID FILLED ORAL at 17:41

## 2018-08-11 RX ADMIN — LORATADINE 10 MG: 10 TABLET ORAL at 11:05

## 2018-08-11 RX ADMIN — HUMAN RHO(D) IMMUNE GLOBULIN 300 MCG: 300 INJECTION, SOLUTION INTRAMUSCULAR at 11:06

## 2018-08-11 RX ADMIN — DOCUSATE SODIUM 100 MG: 100 CAPSULE, LIQUID FILLED ORAL at 08:12

## 2018-08-11 RX ADMIN — KETOROLAC TROMETHAMINE 30 MG: 30 INJECTION, SOLUTION INTRAMUSCULAR at 00:46

## 2018-08-11 RX ADMIN — LEVOTHYROXINE SODIUM 100 MCG: 100 TABLET ORAL at 05:22

## 2018-08-11 RX ADMIN — Medication 1000 MG: at 21:54

## 2018-08-11 RX ADMIN — ACETAMINOPHEN 650 MG: 325 TABLET, FILM COATED ORAL at 05:22

## 2018-08-11 RX ADMIN — BUTALBITAL, ACETAMINOPHEN, AND CAFFEINE 1 TABLET: 50; 325; 40 TABLET ORAL at 18:46

## 2018-08-11 RX ADMIN — ACETAMINOPHEN 650 MG: 325 TABLET, FILM COATED ORAL at 11:19

## 2018-08-11 RX ADMIN — HYDROCORTISONE: 1 CREAM TOPICAL at 17:41

## 2018-08-11 RX ADMIN — KETOROLAC TROMETHAMINE 30 MG: 30 INJECTION, SOLUTION INTRAMUSCULAR at 05:22

## 2018-08-11 NOTE — PROGRESS NOTES
Progress Note - OB/GYN   Radha Robles 28 y o  female MRN: [id number]  Unit/Bed#: -01 Encounter: [id number]    Assessment:  Post operative Day #1 s/p primary low-transverse  section, stable, baby in room    Plan:  1) bleeding requiring Methergine and Pitocin   16:00 CBC 9, AM CBC pending  2) Hypothyroidism   Resume home levothyroxine, 100 micrograms daily  3) Rh-negative status   Rhogam ordered, infant Rh positive  4) Continue routine post partum care   Encourage ambulation   Encourage breastfeeding   Anticipate discharge Monday     Subjective/Objective   Chief Complaint:     Post delivery  Patient is doing well  Lochia WNL  Pain well controlled  Patient complains of cramping this morning  Subjective:     Pain: yes, cramping, improved with meds  Tolerating PO: yes  Voiding:  Fully removed this a m , pending void check  Flatus:  Yes  BM:  No  Ambulating: yes  Breastfeeding:  Yes  Chest pain: no  Shortness of breath: no  Leg pain: no  Lochia:  Moderate    Objective:     Vitals: /85 (BP Location: Left arm)   Pulse 76   Temp 98 °F (36 7 °C) (Oral)   Resp 18   Ht 5' (1 524 m)   Wt 69 9 kg (154 lb)   LMP 11/15/2017 (Exact Date)   SpO2 98%   Breastfeeding? Yes   BMI 30 08 kg/m²       Intake/Output Summary (Last 24 hours) at 18 0718  Last data filed at 18 0656   Gross per 24 hour   Intake             3480 ml   Output             3250 ml   Net              230 ml       Lab Results   Component Value Date    WBC 19 83 (H) 2018    HGB 7 8 (L) 2018    HCT 23 3 (L) 2018    MCV 97 2018     2018       Physical Exam:     Gen: AAOx3, NAD  CV: RRR  Lungs: CTA b/l  Abd: Soft, non-tender, non-distended, no rebound or guarding  Incision clean dry and intact  Skin edges are well approximated with no signs of bleeding or hematoma formation or infection  Uterine fundus firm and mildly tender, at the umbilicus     Ext: Non tender    Kamilah Lyons MD  8/11/2018  7:18 AM

## 2018-08-11 NOTE — PLAN OF CARE
BIRTH - VAGINAL/ SECTION     Fetal and maternal status remain reassuring during the birth process [de-identified]     Emotionally satisfying birthing experience for mother/fetus [address] Discharge to home or other facility with appropriate resources Progressing        INFECTION - ADULT     Absence or prevention of progression during hospitalization Progressing     Absence of fever/infection during neutropenic period Progressing        Knowledge Deficit     Patient/family/caregiver demonstrates understanding of disease process, treatment plan, medications, and discharge instructions Progressing        PAIN - ADULT     Verbalizes/displays adequate comfort level or baseline comfort level Progressing        Potential for Falls     Patient will remain free of falls Progressing        SAFETY ADULT     Maintain or return to baseline ADL function Progressing     Maintain or return mobility status to optimal level Progressing     Patient will remain free of falls Progressing

## 2018-08-11 NOTE — LACTATION NOTE
This note was copied from a baby's chart  Mom states infant was having some latch issues but she feels infant is now improving  Given admission breastfeeding pkt and reviewed normal  infant feeding patterns in the first few days  Encouraged cue based feeds  Encouraged to call for additional assistance as needed

## 2018-08-12 RX ADMIN — LEVOTHYROXINE SODIUM 100 MCG: 100 TABLET ORAL at 06:42

## 2018-08-12 RX ADMIN — DOCUSATE SODIUM 100 MG: 100 CAPSULE, LIQUID FILLED ORAL at 07:55

## 2018-08-12 RX ADMIN — DIPHENHYDRAMINE HCL 25 MG: 25 TABLET ORAL at 00:00

## 2018-08-12 RX ADMIN — LORATADINE 10 MG: 10 TABLET ORAL at 07:55

## 2018-08-12 RX ADMIN — IBUPROFEN 600 MG: 100 SUSPENSION ORAL at 13:09

## 2018-08-12 RX ADMIN — DOCUSATE SODIUM 100 MG: 100 CAPSULE, LIQUID FILLED ORAL at 18:07

## 2018-08-12 RX ADMIN — Medication 1 TABLET: at 07:55

## 2018-08-12 RX ADMIN — IBUPROFEN 600 MG: 100 SUSPENSION ORAL at 07:51

## 2018-08-12 RX ADMIN — IBUPROFEN 600 MG: 100 SUSPENSION ORAL at 19:19

## 2018-08-12 NOTE — PLAN OF CARE
BIRTH - VAGINAL/ SECTION     Fetal and maternal status remain reassuring during the birth process Completed     Emotionally satisfying birthing experience for mother/fetus Completed          DISCHARGE PLANNING     Discharge to home or other facility with appropriate resources Progressing        INFECTION - ADULT     Absence or prevention of progression during hospitalization Progressing     Absence of fever/infection during neutropenic period Progressing        Knowledge Deficit     Patient/family/caregiver demonstrates understanding of disease process, treatment plan, medications, and discharge instructions Progressing        PAIN - ADULT     Verbalizes/displays adequate comfort level or baseline comfort level Progressing        POSTPARTUM     Experiences normal postpartum course Progressing     Appropriate maternal -  bonding Progressing     Establishment of infant feeding pattern Progressing     Incision(s), wounds(s) or drain site(s) healing without S/S of infection Progressing        Potential for Falls     Patient will remain free of falls Progressing        SAFETY ADULT     Maintain or return to baseline ADL function Progressing     Maintain or return mobility status to optimal level Progressing     Patient will remain free of falls Progressing

## 2018-08-12 NOTE — LACTATION NOTE
This note was copied from a baby's chart  Mom states infant has been nursing OK  Infant assisted to breast in cross cradle hold  infant attempting but no latch  Mom has pumped but not obtaining colostrum yet  Feeding options discussed  Offered choice of supplement  Offered choice of alternative feeding methods  Mom assisted to finger feed donor breast milk via SNS

## 2018-08-12 NOTE — PROGRESS NOTES
Progress Note - OB/GYN   Radha Robles 28 y o  female MRN: [id number]  Unit/Bed#: -01 Encounter: [id number]    Assessment:  Post operative Day #2 s/p 1LTCS for anatomy, stable, baby in room    Plan:  1) Bleeding requiring methergine and pitocin   CBC 9 7 to 7 8   Asymptomatic  2) Hypothyroidism   200mcg levothyroxine daily  3) Rh negative   Rhogam   4) Continue routine post partum care   Encourage ambulation   Encourage breastfeeding   Anticipate discharge tomorrow     Subjective/Objective   Chief Complaint:     Post delivery  Patient is doing well  Lochia WNL  Pain well controlled, minimal cramping this morning  Subjective:     Pain: yes, cramping, improved with meds  Tolerating PO: yes  Voiding: yes  Flatus: yes  BM: no  Ambulating: yes  Breastfeeding:  yes  Chest pain: no  Shortness of breath: no  Leg pain: no  Lochia: minimal    Objective:     Vitals: /76 (BP Location: Left arm)   Pulse 79   Temp 98 2 °F (36 8 °C) (Oral)   Resp 18   Ht 5' (1 524 m)   Wt 69 9 kg (154 lb)   LMP 11/15/2017 (Exact Date)   SpO2 98%   Breastfeeding? Yes   BMI 30 08 kg/m²       Intake/Output Summary (Last 24 hours) at 08/12/18 0725  Last data filed at 08/11/18 1001   Gross per 24 hour   Intake                0 ml   Output              500 ml   Net             -500 ml       Lab Results   Component Value Date    WBC 19 83 (H) 08/11/2018    HGB 7 8 (L) 08/11/2018    HCT 23 3 (L) 08/11/2018    MCV 97 08/11/2018     08/11/2018       Physical Exam:     Gen: AAOx3, NAD  CV: RRR  Lungs: CTA b/l  Abd: Soft, non-tender, non-distended, no rebound or guarding  Incision C/D/I  Edges well approximated with no signs of bleeding, drainage, or infection  Uterine fundus firm and non-tender, 1 cm below the umbilicus     Ext: Non tender    Alba Rogers MD  8/12/2018  7:25 AM

## 2018-08-12 NOTE — LACTATION NOTE
This note was copied from a baby's chart  Assisted infant to breast in cross cradle and football holds  Both nipples with bruising around areola  Infant making good attempts, but no latch  Feeding options discussed with parents  Mom pumped breasts, no colostrum obtained  Given choice of supplements and feeding options  Mom wants to use donor milk  Primary nurse notified of same to obtain permits

## 2018-08-13 VITALS
HEART RATE: 76 BPM | TEMPERATURE: 97.9 F | RESPIRATION RATE: 18 BRPM | DIASTOLIC BLOOD PRESSURE: 65 MMHG | SYSTOLIC BLOOD PRESSURE: 131 MMHG | HEIGHT: 60 IN | BODY MASS INDEX: 30.23 KG/M2 | WEIGHT: 154 LBS | OXYGEN SATURATION: 98 %

## 2018-08-13 DIAGNOSIS — O24.419 GESTATIONAL DIABETES MELLITUS (GDM) IN FIRST TRIMESTER, GESTATIONAL DIABETES METHOD OF CONTROL UNSPECIFIED: ICD-10-CM

## 2018-08-13 DIAGNOSIS — Z3A.12 12 WEEKS GESTATION OF PREGNANCY: ICD-10-CM

## 2018-08-13 RX ORDER — OXYCODONE HYDROCHLORIDE 5 MG/1
5 TABLET ORAL EVERY 4 HOURS PRN
Qty: 15 TABLET | Refills: 0 | Status: SHIPPED | OUTPATIENT
Start: 2018-08-13 | End: 2018-08-23

## 2018-08-13 RX ORDER — BLOOD SUGAR DIAGNOSTIC
STRIP MISCELLANEOUS
Refills: 3 | OUTPATIENT
Start: 2018-08-13

## 2018-08-13 RX ADMIN — IBUPROFEN 600 MG: 100 SUSPENSION ORAL at 09:01

## 2018-08-13 RX ADMIN — IBUPROFEN 100 MG: 100 SUSPENSION ORAL at 14:36

## 2018-08-13 RX ADMIN — LEVOTHYROXINE SODIUM 100 MCG: 100 TABLET ORAL at 06:18

## 2018-08-13 RX ADMIN — DOCUSATE SODIUM 100 MG: 100 CAPSULE, LIQUID FILLED ORAL at 09:01

## 2018-08-13 RX ADMIN — IBUPROFEN 600 MG: 100 SUSPENSION ORAL at 01:32

## 2018-08-13 RX ADMIN — LORATADINE 10 MG: 10 TABLET ORAL at 09:01

## 2018-08-13 RX ADMIN — Medication 1000 MG: at 00:08

## 2018-08-13 RX ADMIN — DIPHENHYDRAMINE HCL 25 MG: 25 TABLET ORAL at 01:33

## 2018-08-13 NOTE — PLAN OF CARE
DISCHARGE PLANNING     Discharge to home or other facility with appropriate resources Progressing        INFECTION - ADULT     Absence or prevention of progression during hospitalization Progressing     Absence of fever/infection during neutropenic period Progressing        Knowledge Deficit     Patient/family/caregiver demonstrates understanding of disease process, treatment plan, medications, and discharge instructions Progressing        PAIN - ADULT     Verbalizes/displays adequate comfort level or baseline comfort level Progressing        POSTPARTUM     Experiences normal postpartum course Progressing     Appropriate maternal -  bonding Progressing     Establishment of infant feeding pattern Progressing     Incision(s), wounds(s) or drain site(s) healing without S/S of infection Progressing        Potential for Falls     Patient will remain free of falls Progressing        SAFETY ADULT     Maintain or return to baseline ADL function Progressing     Maintain or return mobility status to optimal level Progressing     Patient will remain free of falls Progressing

## 2018-08-13 NOTE — PROGRESS NOTES
braclets identifies with mom  Footprint sheet had different number, Mom states they lost one in the OR and changed bracelets   Mom and bab bracelets matched   Charge nurse notified

## 2018-08-13 NOTE — PROGRESS NOTES
Progress Note - OB/GYN   Radha Robles 28 y o  female MRN: [id number]  Unit/Bed#: -01 Encounter: [id number]    Assessment:  PP/POD#3 s/p Primary low transverse  section, stable      Plan:  1  Continue routine post-partum care   - Encourage ambulation   - Encourage breastfeeding  2  PPH (EBL 1L)   - s/p methergine   - Hb 13 1 -> 7 8   - VSS  3  Hypothyroidism    - Synthroid 100mcg daily  4  Rh negative   - Needs rhogam (given )  5  Dispo - discharge home today    Subjective/Objective   Chief Complaint:     PP/POD#3 s/p Primary low transverse  section    Subjective:     Pain: yes, well controlled  Tolerating PO: yes  Voiding: yes  Flatus: yes  BM: yes  Ambulating: yes  Breastfeeding: Breastfeeding  Chest pain: no  Shortness of breath: no  Leg pain: no  Lochia: WNL    Objective:     Vitals:  Vitals:    18 0400 18 0904 18 1605 18 0000   BP: 138/76 131/75 135/75 143/83   BP Location: Left arm Left arm Left arm Left arm   Pulse: 79 83 88 87   Resp:    Temp: 98 2 °F (36 8 °C) 97 8 °F (36 6 °C) 98 3 °F (36 8 °C) 97 8 °F (36 6 °C)   TempSrc: Oral Oral Oral Oral   SpO2:   97% 98%   Weight:       Height:           Physical Exam:     Physical Exam   Constitutional: She appears well-developed and well-nourished  No distress  Cardiovascular: Normal rate, regular rhythm and normal heart sounds  Pulmonary/Chest: Effort normal and breath sounds normal  No respiratory distress  She has no wheezes  Incision: clean/dry/intact; no erythema or warmth, no signs of active bleeding or infection    Uterine fundus firm and non-tender, -2 cm below the umbilicus       Lab, Imaging and other studies: I have personally reviewed pertinent reports        Lab Results   Component Value Date    WBC 19 83 (H) 2018    HGB 7 8 (L) 2018    HCT 23 3 (L) 2018    MCV 97 2018     2018           Marlena Jaimes MD  18  6:39 AM

## 2018-08-13 NOTE — LACTATION NOTE
This note was copied from a baby's chart  Encouraged parents to watch breastfeeding class in the education area of My Chart Bedside  Gave suggestions on how to accomplish deep latch by starting latch with infant's nose at the nipple  Then, stroke the upper lip with the nipple  As infant opens mouth, insert nipple in on an upward angle so that the nipple impacts with the soft palate to increase comfort with the feeding and to keep infant interested in the feeding longer  Spent time working on [employer] to facilitate better transfer of breastmilk  Encouraged Radha to increase the frequency of breast feeding  Offer breast first, follow up with breast milk in SNS at the breast or finger feed  Pump for next feeding  Made appointment for Chandra Huerta and infant with [employer] for 8% weight loss, difficult latch and continued use of donor milk while in the hospital     Met with mother to go over feeding log since birth for the first week  Emphasized 8 or more (12) feedings in a 24 hour period, what to expect for the number of diapers per day of life and the progression of properties of the  stooling pattern  Discussed s/s that breastfeeding is going well after day 4 and when to get help from a pediatrician or lactation support person after day 4  Booklet included Breast Pumping Instructions, When You Go Back to Work or School, and Breastfeeding Resources for after discharge including access to the number for the [employer]  Powerpoints given on mom/ care class and breastfeeding class at patient request     Discussed s/s engorgement and how to manage with medications and cool compresses as well as s/s mastitis and when to contact physician  Encoraged MOB and FOB to call for assistance, questions and concerns  Extension number for inpatient lactation support provided

## 2018-08-14 ENCOUNTER — TELEPHONE (OUTPATIENT)
Dept: OBGYN CLINIC | Facility: CLINIC | Age: 33
End: 2018-08-14

## 2018-08-14 ENCOUNTER — OFFICE VISIT (OUTPATIENT)
Dept: POSTPARTUM | Facility: CLINIC | Age: 33
End: 2018-08-14
Payer: COMMERCIAL

## 2018-08-14 VITALS — TEMPERATURE: 98.5 F | SYSTOLIC BLOOD PRESSURE: 140 MMHG | DIASTOLIC BLOOD PRESSURE: 82 MMHG

## 2018-08-14 DIAGNOSIS — Z71.89 ENCOUNTER FOR BREAST FEEDING COUNSELING: Primary | ICD-10-CM

## 2018-08-14 DIAGNOSIS — O92.70 LACTATION PROBLEM: ICD-10-CM

## 2018-08-14 PROCEDURE — 99404 PREV MED CNSL INDIV APPRX 60: CPT | Performed by: PEDIATRICS

## 2018-08-14 NOTE — PROGRESS NOTES
INITIAL BREAST FEEDING EVALUATION    Informant/Relationship: Radha    Discussion of General Lactation Issues: Marquita Youssef has been a struggle since [place] and Aida has been born  There appears to be a delay in Radha's milk coming in as well  They have been supplementing with formula via SNS  Infant is 1days old today   History:  Fertility Problem:yes - IVF x3 after failed IUIs  Breast changes:yes - Areola got larger and darker  Breasts were a little gomez  : no scheduled  at 38 weeks due to GDM and breech presentation  Full term:yes - 38 weeks   labor:no  First nursing/attempt < 1 hour after birth:no Delayed until mom was in the recovery room  Skin to skin following delivery:no  Delayed until mom was in the recovery room  Breast changes after delivery:no  Rooming in (infant in room with mother with exception of procedures, eg  Circumcision: no  Went to the nursery a few hours each night  Blood sugar issues:no  NICU stay:no  Jaundice:no  Phototherapy:no  Supplement given: (list supplement and method used as well as reason(s):yes - donor milk or formula via finger feeding      Past Medical History:   Diagnosis Date    Abnormal Pap smear of cervix 2016    HPV +, colposcopy, follow-up 6 months later was WNL    Asthma     childhood, uses rescue inhaler-only couple of times a year    Complication of anesthesia     nausea    Depression     Diabetes mellitus ([address] )     GDM    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, IVF pregnancy 3 rounds     HPV (human papilloma virus) infection 2016    colpo done, WNL at 6 month f/u    Hypothyroidism 2017    pt taking synthyroid 75mcg    Recurrent pregnancy loss, antepartum condition or complication     SAB - 6wks, and 2 chemical pregnancies during IVF process    Rh incompatibility     Varicella     had disease         Current Outpatient Prescriptions:     ACCU-CHEK FASTCLIX LANCETS MISC, Use 4 a day , Disp: 102 each, Rfl: 3    ACCU-CHEK FASTCLIX LANCETS [facility], USE 4 A DAY, Disp: 102 each, Rfl: 1    ibuprofen (MOTRIN) 100 mg/5 mL suspension, Take 30 mL (600 mg total) by mouth every 6 (six) hours as needed (cramping), Disp: , Rfl: 0    Insulin Pen Needle 32G X 4 MM MISC, Use a directed; inject 2 times per day , Disp: 100 each, Rfl: 1    levothyroxine 100 mcg tablet, TAKE 1 TABLET(100 MCG) BY MOUTH DAILY, Disp: 30 tablet, Rfl: 2    oxyCODONE (ROXICODONE) 5 mg immediate release tablet, Take 1 tablet (5 mg total) by mouth every 4 (four) hours as needed for moderate pain for up to 10 days Max Daily Amount: 30 mg, Disp: 15 tablet, Rfl: 0    Prenatal Vit-Fe Fumarate-FA (PRENATAL VITAMIN PO), Take by mouth, Disp: , Rfl:     PROAIR  (90 Base) MCG/ACT inhaler, TAKE 1-2 PUFFS 4 TIMES A DAY AS NEEDED SHORTNESS OF BREATH, Disp: 8 5 Inhaler, Rfl: 0  No current facility-administered medications for this visit  Allergies   Allergen Reactions    Metronidazole Myalgia     leg cramps    Gluten Meal GI Intolerance       History   Drug Use No       Social History Never a smoker    Interval Breastfeeding History:    Frequency of breast feeding: Attempted twice so far today     Does mother feel breastfeeding is effective: No  Does infant appear satisfied after nursing:No  Stooling pattern normal: Yes  Urinating frequently:Yes  Using shield or shells: No    Alternative/Artificial Feedings:   Bottle: No  Cup: No  Syringe/Finger: Yes, after attempts at the breast           Formula Type: Similac Advance                     Amount: 30ml            Breast Milk:                      Amount: none            Frequency Q 3 Hr between feedings  Elimination Problems: No      Equipment:  Nipple Shield             Type: none             Size: n/a             Frequency of Use: n/a  Pump            Type: Medela Pump in Style            Frequency of Use: Twice a day  Shells            Type: none            Frequency of use: n/a    Equipment Problems: no    Mom:  Breast: Medium sized slightly asymmetrical breasts  R>L  Soft  Unable to express colostrum  Nipple Assessment in General: Normal: elongated/eraser  Healing abrasions on the left areola  Mother's Awareness of Feeding Cues                 Recognizes: Yes                  Verbalizes: Yes  Support System: FOB, extended family  History of Breastfeeding: none  Changes/Stressors/Violence: Brandy Arrington is concerned about her ability to breastfeed  Concerns/Goals: Radha would like to breastfeed for 6 months but will settle for 3 months  Problems with Mom: Delayed lactogenesis    Physical Exam   Constitutional: She is oriented to person, place, and time  She appears well-developed and well-nourished  HENT:   Head: Normocephalic and atraumatic  Neck: Normal range of motion  Cardiovascular: Normal rate, regular rhythm, normal heart sounds and intact distal pulses  Pulmonary/Chest: Effort normal and breath sounds normal    Musculoskeletal: Normal range of motion  She exhibits edema  Neurological: She is alert and oriented to person, place, and time  Skin: Skin is warm and dry  Psychiatric: She has a normal mood and affect  Her behavior is normal  Judgment and thought content normal        Infant:  Behaviors: Alert  Color: Jaundice  Birth weight: 3175gram  Current weight: 2910gram    Problems with infant: Won't latch to nurse  General Appearance:  Alert, active, no distress                            Head:  Normocephalic, AFOF, sutures opposed                            Eyes:   Conjunctiva clear, no drainage                            Ears:   Normally placed, no anomolies                           Nose:   no drainage or erythema                          Mouth:  No lesions  Tongue extends beyond the lower lip, lateralizes completely and elevates to mid mouth  Strong coordinated suck                     Neck:  Supple, symmetrical, trachea midline                Respiratory:  No grunting, flaring, retractions, breath sounds clear and equal           Cardiovascular:  Regular rate and rhythm  No murmur  Adequate perfusion/capillary refill  Femoral pulse present                  Abdomen:    Soft, non-tender, no masses, bowel sounds present, no HSM            Genitourinary:  Normal female genitalia, anus patent                         Spine:   No abnormalities noted       Musculoskeletal:   Full range of motion         Skin/Hair/Nails:   Skin warm, dry, and intact, no rashes  Jaundice to umbilicus               Neurologic:   No abnormal movement, tone appropriate for gestational age     Latch:  Efficiency:               Lips Flanged: Yes              Depth of latch: excellent              Audible Swallow: No              Visible Milk: No              Wide Open/ Asymmetrical: Yes              Suck Swallow Cycle: Breathing: unlabored, Coordinated: yes  Nipple Assessment after latch: Normal: elongated/eraser, no discoloration and no damage noted  Latch Problems: None  After discussion and demonstration of the elements of positioning and latch, Radha was able to help [facility] latch effectively  After a few minutes of suckling on the first breast, [facility] became fussy and would not latch again to that breast   Alesha Crum offered the second breast and while [facility] was suckling, an SNS was introduced and she finished the feeding at that breast   She took 30ml of formula through the SNS    Position:  Infant's Ergonomics/Body               Body Alignment: Yes               Head Supported: Yes               Close to Mom's body/ Lifted/ Supported: Yes               Mom's Ergonomics/Body: Yes                           Supported:  Yes                           Sitting Back: Yes                           Brings Baby to her breast: Yes  Positioning Problems: None      Handouts:   Hands on pumping, Hand expression, Increasing your supply and Latch check list    Education:  Reviewed Latch: Demonstrated how to gently compress the breast and align the baby so that his nose is just above the nipple with his lower lip and chin touching the breast to encourage the deepest, widest, off-center latch  Reviewed Positioning for Dyad: John Luna was able to independently demonstrate comfortable positioning for her and [facility]  Reviewed Frequency/Supply & Demand: Discussed how frequent breast stimulation, either by nursing or pumping, is essential for establishing milk supply  Reviewed Infant:Cues and varied States of Awareness  Reviewed Alternative/Artificial Feedings: Demonstrated use of an SNS while feeding at the breast  Reviewed Equipment: Discussed and demonstrated use and features of the Medela Pump in Style and elements of hands on pumping  Plan for breastfeeding    Reassurance and support given  Reviewed normal sucking patterns: transition from stimulation to nutritive to release or non-nutritive  The goal is to see a steady rhythm of suckling and swallowing (like you see when you use the SNS at the breast)  Reviewed normal nursing pattern: infant should nurse for at least 5 minutes or until releases on own  Discussed difference in sensation of non-nutritive v nutritive sucking  Galactogogues discuseed (note if fenugeek or mother's milk tea  Hand out given regarding increasing supply  Increase frequency of expression  Stimulating your breasts either by nursing or pumping effectively 8 times a day can help establish your milk supply  Manual expression demonstrated  Hand expression is an important skill to have when you are breastfeeding  Supplementation recommended (document method-education if necessary)  Expressed breastmilk or formula via SNS as needed until your milk comes in  When feeding at the breast, Compress your breast to make it narrow in the same direction your baby's  mouth is positioned  Move your baby onto your breast so their chin touches first and their head tips back  Your nipple should be at their nose  When they open their mouth wide, move them onto the breast so your nipple enters their mouth pointing upward  If the latch hurts, stop and try again  When pumping, Cycle your pump through stimulation and expression mode several times in a session to stimulate several let downs  Use hands on pumping and hand expression to increase your output  Maintain your pump as recommended  Please call with any questions or concerns  I have spent 60 minutes with Patient and family today in which greater than 50% of this time was spent in counseling/coordination of care regarding Intructions for management

## 2018-08-14 NOTE — PATIENT INSTRUCTIONS
Plan for breastfeeding    Reassurance and support given  Reviewed normal sucking patterns: transition from stimulation to nutritive to release or non-nutritive  The goal is to see a steady rhythm of suckling and swallowing (like you see when you use the SNS at the breast)  Reviewed normal nursing pattern: infant should nurse for at least 5 minutes or until releases on own  Discussed difference in sensation of non-nutritive v nutritive sucking  Galactogogues discuseed (note if fenugeek or mother's milk tea  Hand out given regarding increasing supply  Increase frequency of expression  Stimulating your breasts either by nursing or pumping effectively 8 times a day can help establish your milk supply  Manual expression demonstrated  Hand expression is an important skill to have when you are breastfeeding  Supplementation recommended (document method-education if necessary)  Expressed breastmilk or formula via SNS as needed until your milk comes in  When feeding at the breast, Compress your breast to make it narrow in the same direction your baby's  mouth is positioned  Move your baby onto your breast so their chin touches first and their head tips back  Your nipple should be at their nose  When they open their mouth wide, move them onto the breast so your nipple enters their mouth pointing upward  If the latch hurts, stop and try again  When pumping, Cycle your pump through stimulation and expression mode several times in a session to stimulate several let downs  Use hands on pumping and hand expression to increase your output  Maintain your pump as recommended  Please call with any questions or concerns

## 2018-08-15 ENCOUNTER — TRANSITIONAL CARE MANAGEMENT (OUTPATIENT)
Dept: FAMILY MEDICINE CLINIC | Facility: CLINIC | Age: 33
End: 2018-08-15

## 2018-08-15 ENCOUNTER — POSTPARTUM VISIT (OUTPATIENT)
Dept: OBGYN CLINIC | Facility: CLINIC | Age: 33
End: 2018-08-15

## 2018-08-15 VITALS
HEIGHT: 60 IN | BODY MASS INDEX: 27.64 KG/M2 | DIASTOLIC BLOOD PRESSURE: 90 MMHG | WEIGHT: 140.8 LBS | SYSTOLIC BLOOD PRESSURE: 140 MMHG

## 2018-08-15 PROCEDURE — 99024 POSTOP FOLLOW-UP VISIT: CPT | Performed by: OBSTETRICS & GYNECOLOGY

## 2018-08-15 NOTE — TELEPHONE ENCOUNTER
Pt has red raised rash on abdomen, moreso now @ ends of incision - using H-C cream   Will see in office today, MARIYA Zendejas  Pt informed

## 2018-08-17 ENCOUNTER — TELEPHONE (OUTPATIENT)
Dept: POSTPARTUM | Facility: CLINIC | Age: 33
End: 2018-08-17

## 2018-08-17 NOTE — TELEPHONE ENCOUNTER
Janusz Griffith is pumping or nursing much more frequently now but still only able to express drops  She is using the SNS at the breast for every feeding  She is concerned that her milk is not in yet  Her baby is 9days old today  She is not ready to give up  I encouraged Janusz Griffith to continue with the current plan if she is determined to continue trying  She has several risk factors for delayed lactogenesis and may just need more time  I also encouraged her to continue to supplement her baby as needed to assure baby is well fed  Janusz Griffith agrees with plan and will call back with any additional questions or concerns

## 2018-08-20 NOTE — PROGRESS NOTES
1 week post s/p c section, doing well , incision healing well  No problems  RTO 2 weeks for nurse visit

## 2018-08-21 ENCOUNTER — POSTPARTUM VISIT (OUTPATIENT)
Dept: OBGYN CLINIC | Facility: CLINIC | Age: 33
End: 2018-08-21

## 2018-08-21 VITALS
HEIGHT: 60 IN | BODY MASS INDEX: 26.35 KG/M2 | SYSTOLIC BLOOD PRESSURE: 122 MMHG | WEIGHT: 134.2 LBS | DIASTOLIC BLOOD PRESSURE: 90 MMHG

## 2018-08-21 PROCEDURE — 99024 POSTOP FOLLOW-UP VISIT: CPT | Performed by: OBSTETRICS & GYNECOLOGY

## 2018-08-21 NOTE — PROGRESS NOTES
THIS IS A 28YEAR-OLD FEMALE APPROXIMATELY 11 DAYS STATUS POST PRIMARY  SECTION SHE HAD A RASH AND FOR INCISION INSPECTION TODAY  THE RASH IS GONE  INCISIONS GOAL HEALING WELL  BLEEDING IS REALLY SLOW DOWN   SHE IS ON PROBLEM WITH NURSING SHE MAY OUT THE SUPPLEMENT HER INFANT OTHERWISE DOING WELL    RETURN TO OFFICE IN 2 WEEKS TO SEE THE NURSE FOR POSTPARTUM CHECKUP

## 2018-08-29 ENCOUNTER — POSTPARTUM VISIT (OUTPATIENT)
Dept: OBGYN CLINIC | Facility: CLINIC | Age: 33
End: 2018-08-29

## 2018-08-29 VITALS
HEIGHT: 60 IN | WEIGHT: 130.8 LBS | DIASTOLIC BLOOD PRESSURE: 76 MMHG | SYSTOLIC BLOOD PRESSURE: 102 MMHG | BODY MASS INDEX: 25.68 KG/M2

## 2018-08-29 DIAGNOSIS — E03.9 HYPOTHYROIDISM, UNSPECIFIED TYPE: ICD-10-CM

## 2018-08-29 DIAGNOSIS — Z3A.38 38 WEEKS GESTATION OF PREGNANCY: ICD-10-CM

## 2018-08-29 DIAGNOSIS — O34.593 PREGNANCY WITH CONGENITAL DUPLICATION OF UTERUS IN THIRD TRIMESTER: ICD-10-CM

## 2018-08-29 DIAGNOSIS — E03.8 OTHER SPECIFIED HYPOTHYROIDISM: ICD-10-CM

## 2018-08-29 DIAGNOSIS — Q51.28 PREGNANCY WITH CONGENITAL DUPLICATION OF UTERUS IN THIRD TRIMESTER: ICD-10-CM

## 2018-08-29 DIAGNOSIS — O24.414 INSULIN CONTROLLED GESTATIONAL DIABETES MELLITUS (GDM) IN THIRD TRIMESTER: ICD-10-CM

## 2018-08-29 PROCEDURE — 99024 POSTOP FOLLOW-UP VISIT: CPT | Performed by: OBSTETRICS & GYNECOLOGY

## 2018-08-29 NOTE — PROGRESS NOTES
3 wk postpartum appt:  8/10/18 (38 2/7 wk)   C/S (breech)   "GERMÁN"  7 lbs    Lochia - light flow, brown-red  Normal bowel & bladder habits  Reminded about Kegels  Taking pnv vits w/ dha  Botllefeeding mostly q 2-3 hrs - decreased milk supply - trying to continue breastfeeding - [facility]  They recom recheck thyroid labs  Birth control - prev on ocps - discussed deciding on method of feeding since decreased milk supply already - recom barrier birth control for now (this was IVF preg)  Gestational diabetes - insulin - ordered FBS & 2 hr post 75 gm glucose (to be done after 6 wk postpartum)  Had Rhogam during pregnancy & after deliver (had passive D antibody)  rec juan sequels BID for hgb = 7 8 (7/11/18) & increase dietary FE  Ped - [facility] - gaining weight - next appt 9/12/18  Had TDAP after delivery  Incision healing well  Postpartum packet given  Return to office for 6 wk postpartum appt

## 2018-08-30 DIAGNOSIS — D64.9 LOW HEMOGLOBIN: Primary | ICD-10-CM

## 2018-08-30 NOTE — PROGRESS NOTES
I have reviewed the notes, assessments, and/or procedures performed by Patrizia Hodgson RN, IBCLC, I concur with her/his documentation of Araceli Guevara

## 2018-08-31 ENCOUNTER — APPOINTMENT (OUTPATIENT)
Dept: LAB | Facility: HOSPITAL | Age: 33
End: 2018-08-31
Attending: OBSTETRICS & GYNECOLOGY
Payer: COMMERCIAL

## 2018-08-31 DIAGNOSIS — E03.9 HYPOTHYROIDISM, UNSPECIFIED TYPE: ICD-10-CM

## 2018-08-31 LAB
BASOPHILS # BLD AUTO: 0.05 THOUSANDS/ΜL (ref 0–0.1)
BASOPHILS NFR BLD AUTO: 1 % (ref 0–1)
EOSINOPHIL # BLD AUTO: 0.08 THOUSAND/ΜL (ref 0–0.61)
EOSINOPHIL NFR BLD AUTO: 1 % (ref 0–6)
ERYTHROCYTE [DISTWIDTH] IN BLOOD BY AUTOMATED COUNT: 12.6 % (ref 11.6–15.1)
HCT VFR BLD AUTO: 33.4 % (ref 34.8–46.1)
HGB BLD-MCNC: 10.5 G/DL (ref 11.5–15.4)
IMM GRANULOCYTES # BLD AUTO: 0.04 THOUSAND/UL (ref 0–0.2)
IMM GRANULOCYTES NFR BLD AUTO: 1 % (ref 0–2)
LYMPHOCYTES # BLD AUTO: 2.5 THOUSANDS/ΜL (ref 0.6–4.47)
LYMPHOCYTES NFR BLD AUTO: 31 % (ref 14–44)
MCH RBC QN AUTO: 30.2 PG (ref 26.8–34.3)
MCHC RBC AUTO-ENTMCNC: 31.4 G/DL (ref 31.4–37.4)
MCV RBC AUTO: 96 FL (ref 82–98)
MONOCYTES # BLD AUTO: 0.5 THOUSAND/ΜL (ref 0.17–1.22)
MONOCYTES NFR BLD AUTO: 6 % (ref 4–12)
NEUTROPHILS # BLD AUTO: 4.93 THOUSANDS/ΜL (ref 1.85–7.62)
NEUTS SEG NFR BLD AUTO: 60 % (ref 43–75)
NRBC BLD AUTO-RTO: 0 /100 WBCS
PLATELET # BLD AUTO: 439 THOUSANDS/UL (ref 149–390)
PMV BLD AUTO: 8.6 FL (ref 8.9–12.7)
RBC # BLD AUTO: 3.48 MILLION/UL (ref 3.81–5.12)
T4 FREE SERPL-MCNC: 1.24 NG/DL (ref 0.76–1.46)
TSH SERPL DL<=0.05 MIU/L-ACNC: 0.12 UIU/ML (ref 0.36–3.74)
WBC # BLD AUTO: 8.1 THOUSAND/UL (ref 4.31–10.16)

## 2018-08-31 PROCEDURE — 84443 ASSAY THYROID STIM HORMONE: CPT

## 2018-08-31 PROCEDURE — 84439 ASSAY OF FREE THYROXINE: CPT

## 2018-08-31 PROCEDURE — 36415 COLL VENOUS BLD VENIPUNCTURE: CPT | Performed by: NURSE PRACTITIONER

## 2018-08-31 PROCEDURE — 85025 COMPLETE CBC W/AUTO DIFF WBC: CPT | Performed by: NURSE PRACTITIONER

## 2018-09-04 LAB — PLACENTA IN STORAGE: NORMAL

## 2018-09-06 ENCOUNTER — TELEPHONE (OUTPATIENT)
Dept: FAMILY MEDICINE CLINIC | Facility: CLINIC | Age: 33
End: 2018-09-06

## 2018-09-06 NOTE — TELEPHONE ENCOUNTER
Patient called stating her OBGYN doesn't want to manage her Thyroid levels after she had her baby  She is asking if you can take a look at her recent blood work results and let her know if any changes need to be made  She said right now she is doing Levothyroxine 100 mcg one daily  Please advise   Patient would like a return call [phone]

## 2018-09-10 DIAGNOSIS — E06.3 HYPOTHYROIDISM DUE TO HASHIMOTO'S THYROIDITIS: Primary | ICD-10-CM

## 2018-09-10 DIAGNOSIS — E03.8 HYPOTHYROIDISM DUE TO HASHIMOTO'S THYROIDITIS: Primary | ICD-10-CM

## 2018-09-10 NOTE — TELEPHONE ENCOUNTER
Harriet Lee , so have her stay on 100 mcg daily , but on Sunday only take half a pill-  Then get thyroid level in 8 weeks-congratulations on the baby!!

## 2018-09-13 ENCOUNTER — HOSPITAL ENCOUNTER (OUTPATIENT)
Facility: HOSPITAL | Age: 33
End: 2018-09-13
Payer: COMMERCIAL

## 2018-09-19 ENCOUNTER — POSTPARTUM VISIT (OUTPATIENT)
Dept: OBGYN CLINIC | Facility: CLINIC | Age: 33
End: 2018-09-19

## 2018-09-19 VITALS
SYSTOLIC BLOOD PRESSURE: 120 MMHG | WEIGHT: 135.2 LBS | DIASTOLIC BLOOD PRESSURE: 80 MMHG | BODY MASS INDEX: 26.55 KG/M2 | HEIGHT: 60 IN

## 2018-09-19 DIAGNOSIS — Z30.011 ENCOUNTER FOR INITIAL PRESCRIPTION OF CONTRACEPTIVE PILLS: ICD-10-CM

## 2018-09-19 PROCEDURE — 99024 POSTOP FOLLOW-UP VISIT: CPT | Performed by: OBSTETRICS & GYNECOLOGY

## 2018-09-19 RX ORDER — PNV NO.95/FERROUS FUM/FOLIC AC 28MG-0.8MG
325 TABLET ORAL DAILY
COMMUNITY

## 2018-09-19 NOTE — PATIENT INSTRUCTIONS
Doing well start birth control pill for contraception to get 75 g glucose tolerance test return to office in 6 months for yearly exam

## 2018-09-19 NOTE — PROGRESS NOTES
This is a 29-year-old white female approximately 6 weeks status post primary  section for arrest disorder  She is bottle-feeding  Infant is doing well  There is no evidence postpartum depression  She desires go on the birth control pill for contraception  She has no absolute contraindications  Breast examination abdominal examination incision inspection and pelvic exam all within normal limits      Impression stable postpartum checkup discharge birth control pills  Use a backup method for 1st month    She will make arrangements to get her 75 g glucose tolerance test   She should return my office in 6 months for yearly exam

## 2018-09-26 ENCOUNTER — TELEPHONE (OUTPATIENT)
Dept: OBGYN CLINIC | Facility: CLINIC | Age: 33
End: 2018-09-26

## 2018-09-26 ENCOUNTER — APPOINTMENT (OUTPATIENT)
Dept: LAB | Facility: HOSPITAL | Age: 33
End: 2018-09-26
Attending: OBSTETRICS & GYNECOLOGY
Payer: COMMERCIAL

## 2018-09-26 LAB
GLUCOSE 2H P 75 G GLC PO SERPL-MCNC: 84 MG/DL (ref 65–139)
GLUCOSE P FAST SERPL-MCNC: 84 MG/DL (ref 65–99)
GLUCOSE P FAST SERPL-MCNC: 87 MG/DL (ref 65–99)

## 2018-09-26 PROCEDURE — 82950 GLUCOSE TEST: CPT

## 2018-09-26 PROCEDURE — 36415 COLL VENOUS BLD VENIPUNCTURE: CPT

## 2018-09-26 PROCEDURE — 82947 ASSAY GLUCOSE BLOOD QUANT: CPT

## 2018-09-26 NOTE — TELEPHONE ENCOUNTER
----- Message from Andrez Mejia MD sent at 9/26/2018 11:49 AM EDT -----  Please inform this patient of normal results

## 2018-09-26 NOTE — TELEPHONE ENCOUNTER
----- Message from Dm Herman MD sent at 9/26/2018 11:49 AM EDT -----  Please inform this patient of normal results

## 2018-10-31 DIAGNOSIS — O99.282 HYPOTHYROIDISM AFFECTING PREGNANCY IN SECOND TRIMESTER: ICD-10-CM

## 2018-10-31 DIAGNOSIS — E03.9 HYPOTHYROIDISM AFFECTING PREGNANCY IN SECOND TRIMESTER: ICD-10-CM

## 2018-10-31 RX ORDER — LEVOTHYROXINE SODIUM 0.1 MG/1
TABLET ORAL
Qty: 30 TABLET | Refills: 0 | OUTPATIENT
Start: 2018-10-31

## 2018-11-03 RX ORDER — LEVOTHYROXINE SODIUM 0.1 MG/1
100 TABLET ORAL DAILY
Qty: 30 TABLET | Refills: 0 | Status: SHIPPED | OUTPATIENT
Start: 2018-11-03

## 2018-11-04 NOTE — TELEPHONE ENCOUNTER
Refilled, but she needs to get her lab tests checked, there is an open order for TSH in her chart  Also needs to establish with me or Dr Kailyn Hamilton in the next 6 months

## 2018-11-05 NOTE — TELEPHONE ENCOUNTER
Called patient and advised, she will get labs done   She will make an appt after the lab results come back

## 2018-12-21 ENCOUNTER — LAB (OUTPATIENT)
Dept: LAB | Facility: HOSPITAL | Age: 33
End: 2018-12-21
Payer: COMMERCIAL

## 2018-12-21 DIAGNOSIS — E03.8 HYPOTHYROIDISM DUE TO HASHIMOTO'S THYROIDITIS: ICD-10-CM

## 2018-12-21 DIAGNOSIS — E06.3 HYPOTHYROIDISM DUE TO HASHIMOTO'S THYROIDITIS: ICD-10-CM

## 2018-12-21 LAB — TSH SERPL DL<=0.05 MIU/L-ACNC: 1.21 UIU/ML (ref 0.36–3.74)

## 2018-12-21 PROCEDURE — 84443 ASSAY THYROID STIM HORMONE: CPT

## 2018-12-21 PROCEDURE — 36415 COLL VENOUS BLD VENIPUNCTURE: CPT

## 2018-12-28 NOTE — PROGRESS NOTES
Your thyroid level is normal, you can continue on your current dose of levothyroxine  If you have any questions or concerns, please call the office at [phone] or schedule a follow-up appointment

## 2019-03-18 ENCOUNTER — ANNUAL EXAM (OUTPATIENT)
Dept: OBGYN CLINIC | Facility: CLINIC | Age: 34
End: 2019-03-18
Payer: COMMERCIAL

## 2019-03-18 VITALS
DIASTOLIC BLOOD PRESSURE: 84 MMHG | WEIGHT: 138 LBS | SYSTOLIC BLOOD PRESSURE: 130 MMHG | HEIGHT: 60 IN | BODY MASS INDEX: 27.09 KG/M2

## 2019-03-18 DIAGNOSIS — Z01.419 WOMEN'S ANNUAL ROUTINE GYNECOLOGICAL EXAMINATION: Primary | ICD-10-CM

## 2019-03-18 PROBLEM — O09.813 PREGNANCY RESULTING FROM IN VITRO FERTILIZATION IN THIRD TRIMESTER: Status: RESOLVED | Noted: 2018-04-19 | Resolved: 2019-03-18

## 2019-03-18 PROBLEM — O24.414 INSULIN CONTROLLED GESTATIONAL DIABETES MELLITUS (GDM) IN THIRD TRIMESTER: Status: RESOLVED | Noted: 2018-04-06 | Resolved: 2019-03-18

## 2019-03-18 PROBLEM — O34.593 PREGNANCY WITH CONGENITAL DUPLICATION OF UTERUS IN THIRD TRIMESTER: Status: RESOLVED | Noted: 2018-02-07 | Resolved: 2019-03-18

## 2019-03-18 PROBLEM — Q51.28 PREGNANCY WITH CONGENITAL DUPLICATION OF UTERUS IN THIRD TRIMESTER: Status: RESOLVED | Noted: 2018-02-07 | Resolved: 2019-03-18

## 2019-03-18 PROBLEM — Z3A.38 38 WEEKS GESTATION OF PREGNANCY: Status: RESOLVED | Noted: 2018-08-10 | Resolved: 2019-03-18

## 2019-03-18 PROBLEM — O32.9XX0 MALPRESENTATION BEFORE ONSET OF LABOR: Status: RESOLVED | Noted: 2018-07-20 | Resolved: 2019-03-18

## 2019-03-18 PROBLEM — O44.43 LOW LYING PLACENTA NOS OR WITHOUT HEMORRHAGE, THIRD TRIMESTER: Status: RESOLVED | Noted: 2018-06-29 | Resolved: 2019-03-18

## 2019-03-18 PROCEDURE — 99395 PREV VISIT EST AGE 18-39: CPT | Performed by: OBSTETRICS & GYNECOLOGY

## 2019-03-18 NOTE — PROGRESS NOTES
HPI     this is a 77-year-old white female, she is a  4 para 1 with 1  section approximately 7 months ago  She has a history of uterine didelphys with a double cervix  Her current method of contraception includes birth control pill  This is working well she wants to continue  She denies any problems sexuality  There is no problem with her  section scar  She denies any problem with depression or anxiety  Her infant is doing well  She still being treated for hypothyroidism  She has a history of asthma  She has a dentist on a regular basis  She denies any other major  GI complaint  She is happy with her weight  REVIEW OF SYSTEMS    the patient denies any significant complaints of any system at this time    PAST MEDICAL HISTORY    significant for infertility, uterine anomaly, asthma, history of an abnormal Pap smear RADHA 1, history with double cervix,    PAST SURGICAL HISTORY   significant for cholecystectomy, with removal of vaginal septum,  section    FAMILY HISTORY    significant for diabetes, hypertension, brain cancer, lung cancer, heart disease heart attack,    SOCIAL HISTORY   negative for tobacco positive for social alcohol    PHYSICAL EXAM    this is a well-developed well-nourished white female acute distress  Her BMI is 27  Her HEENT is was within normal limits  Her neck is supple no masses  Cardiac exam shows a regular rhythm and rate normal S1-S2  Breast exam symmetrical nontender no retraction or dimpling the axilla clear bilaterally  Her abdomen is soft nontender no masses positive bowel sounds  Her  section scars healing well  Pelvic exam the external genitalia normal limits the vagina is clean  There is evidence of a double cervix  A Pap smear was taken from needs cervix right left and labeled  She does have a history of RADHA 1  The uterus is anterior normal size there is no cervical motion tenderness    The adnexa clear bilaterally  IMPRESSION   the patient was informed of a stable gyn examination  She does have a history of RADHA 1  A Pap smear was taken of each cervix  If the Pap smear is normal we will once a year screening if the Pap smear again shows RADHA 1 to return to office in 6 months  She will be informed results when they return  She will be allowed to continue the birth control pill for contraception

## 2019-03-19 LAB
CLINICAL INFO: NORMAL
CYTO CVX: NORMAL
CYTOLOGY CMNT CVX/VAG CYTO-IMP: NORMAL
DATE PREVIOUS BX: NORMAL
HPV E6+E7 MRNA CVX QL NAA+PROBE: NOT DETECTED
LMP START DATE: NORMAL
SL AMB PREV. PAP:: NORMAL
SPECIMEN SOURCE CVX/VAG CYTO: NORMAL

## 2019-09-30 DIAGNOSIS — Z30.011 ENCOUNTER FOR INITIAL PRESCRIPTION OF CONTRACEPTIVE PILLS: ICD-10-CM

## 2019-09-30 DIAGNOSIS — Z30.41 ENCOUNTER FOR SURVEILLANCE OF CONTRACEPTIVE PILLS: Primary | ICD-10-CM

## 2019-09-30 NOTE — TELEPHONE ENCOUNTER
rec'd rf req for generic Estrostep - please sign off on presc to [facility]) - pt due for yearly 3/2020

## 2019-10-11 ENCOUNTER — TRANSCRIBE ORDERS (OUTPATIENT)
Dept: LAB | Facility: HOSPITAL | Age: 34
End: 2019-10-11

## 2019-10-11 ENCOUNTER — APPOINTMENT (OUTPATIENT)
Dept: LAB | Facility: HOSPITAL | Age: 34
End: 2019-10-11
Payer: COMMERCIAL

## 2019-10-11 DIAGNOSIS — E06.3 HYPOTHYROIDISM DUE TO HASHIMOTO'S THYROIDITIS: ICD-10-CM

## 2019-10-11 DIAGNOSIS — E03.8 HYPOTHYROIDISM DUE TO HASHIMOTO'S THYROIDITIS: Primary | ICD-10-CM

## 2019-10-11 DIAGNOSIS — E55.9 VITAMIN D DEFICIENCY: ICD-10-CM

## 2019-10-11 DIAGNOSIS — E06.3 HYPOTHYROIDISM DUE TO HASHIMOTO'S THYROIDITIS: Primary | ICD-10-CM

## 2019-10-11 DIAGNOSIS — E03.8 HYPOTHYROIDISM DUE TO HASHIMOTO'S THYROIDITIS: ICD-10-CM

## 2019-10-11 LAB
25(OH)D3 SERPL-MCNC: 36.7 NG/ML (ref 30–100)
ALBUMIN SERPL BCP-MCNC: 3.9 G/DL (ref 3.5–5)
ALP SERPL-CCNC: 54 U/L (ref 46–116)
ALT SERPL W P-5'-P-CCNC: 19 U/L (ref 12–78)
ANION GAP SERPL CALCULATED.3IONS-SCNC: 5 MMOL/L (ref 4–13)
AST SERPL W P-5'-P-CCNC: 12 U/L (ref 5–45)
BASOPHILS # BLD AUTO: 0.05 THOUSANDS/ΜL (ref 0–0.1)
BASOPHILS NFR BLD AUTO: 1 % (ref 0–1)
BILIRUB SERPL-MCNC: 0.12 MG/DL (ref 0.2–1)
BUN SERPL-MCNC: 17 MG/DL (ref 5–25)
CALCIUM SERPL-MCNC: 9.5 MG/DL (ref 8.3–10.1)
CHLORIDE SERPL-SCNC: 105 MMOL/L (ref 100–108)
CO2 SERPL-SCNC: 28 MMOL/L (ref 21–32)
CREAT SERPL-MCNC: 0.71 MG/DL (ref 0.6–1.3)
EOSINOPHIL # BLD AUTO: 0.07 THOUSAND/ΜL (ref 0–0.61)
EOSINOPHIL NFR BLD AUTO: 1 % (ref 0–6)
ERYTHROCYTE [DISTWIDTH] IN BLOOD BY AUTOMATED COUNT: 11.5 % (ref 11.6–15.1)
GFR SERPL CREATININE-BSD FRML MDRD: 111 ML/MIN/1.73SQ M
GLUCOSE SERPL-MCNC: 99 MG/DL (ref 65–140)
HCT VFR BLD AUTO: 40.1 % (ref 34.8–46.1)
HGB BLD-MCNC: 13.6 G/DL (ref 11.5–15.4)
IMM GRANULOCYTES # BLD AUTO: 0.03 THOUSAND/UL (ref 0–0.2)
IMM GRANULOCYTES NFR BLD AUTO: 0 % (ref 0–2)
LYMPHOCYTES # BLD AUTO: 2.69 THOUSANDS/ΜL (ref 0.6–4.47)
LYMPHOCYTES NFR BLD AUTO: 34 % (ref 14–44)
MCH RBC QN AUTO: 30.8 PG (ref 26.8–34.3)
MCHC RBC AUTO-ENTMCNC: 33.9 G/DL (ref 31.4–37.4)
MCV RBC AUTO: 91 FL (ref 82–98)
MONOCYTES # BLD AUTO: 0.52 THOUSAND/ΜL (ref 0.17–1.22)
MONOCYTES NFR BLD AUTO: 7 % (ref 4–12)
NEUTROPHILS # BLD AUTO: 4.56 THOUSANDS/ΜL (ref 1.85–7.62)
NEUTS SEG NFR BLD AUTO: 57 % (ref 43–75)
NRBC BLD AUTO-RTO: 0 /100 WBCS
PLATELET # BLD AUTO: 273 THOUSANDS/UL (ref 149–390)
PMV BLD AUTO: 8.8 FL (ref 8.9–12.7)
POTASSIUM SERPL-SCNC: 4 MMOL/L (ref 3.5–5.3)
PROT SERPL-MCNC: 7.5 G/DL (ref 6.4–8.2)
RBC # BLD AUTO: 4.41 MILLION/UL (ref 3.81–5.12)
SODIUM SERPL-SCNC: 138 MMOL/L (ref 136–145)
TSH SERPL DL<=0.05 MIU/L-ACNC: 1.51 UIU/ML (ref 0.36–3.74)
WBC # BLD AUTO: 7.92 THOUSAND/UL (ref 4.31–10.16)

## 2019-10-11 PROCEDURE — 82306 VITAMIN D 25 HYDROXY: CPT

## 2019-10-11 PROCEDURE — 85025 COMPLETE CBC W/AUTO DIFF WBC: CPT

## 2019-10-11 PROCEDURE — 84443 ASSAY THYROID STIM HORMONE: CPT

## 2019-10-11 PROCEDURE — 36415 COLL VENOUS BLD VENIPUNCTURE: CPT

## 2019-10-11 PROCEDURE — 80053 COMPREHEN METABOLIC PANEL: CPT

## 2020-03-23 DIAGNOSIS — Z30.41 ENCOUNTER FOR SURVEILLANCE OF CONTRACEPTIVE PILLS: ICD-10-CM

## 2020-03-24 RX ORDER — NORETHINDRONE ACETATE AND ETHINYL ESTRADIOL 5-7-9-7
KIT ORAL
Qty: 84 TABLET | Refills: 1 | Status: SHIPPED | OUTPATIENT
Start: 2020-03-24 | End: 2020-07-20 | Stop reason: SDUPTHER

## 2020-07-20 ENCOUNTER — ANNUAL EXAM (OUTPATIENT)
Dept: OBGYN CLINIC | Facility: CLINIC | Age: 35
End: 2020-07-20
Payer: COMMERCIAL

## 2020-07-20 VITALS
WEIGHT: 134 LBS | BODY MASS INDEX: 26.31 KG/M2 | SYSTOLIC BLOOD PRESSURE: 122 MMHG | HEIGHT: 60 IN | DIASTOLIC BLOOD PRESSURE: 60 MMHG | TEMPERATURE: 99.7 F

## 2020-07-20 DIAGNOSIS — Z01.419 WOMEN'S ANNUAL ROUTINE GYNECOLOGICAL EXAMINATION: Primary | ICD-10-CM

## 2020-07-20 DIAGNOSIS — Z30.41 ENCOUNTER FOR SURVEILLANCE OF CONTRACEPTIVE PILLS: ICD-10-CM

## 2020-07-20 PROCEDURE — S0612 ANNUAL GYNECOLOGICAL EXAMINA: HCPCS | Performed by: OBSTETRICS & GYNECOLOGY

## 2020-07-20 RX ORDER — ESCITALOPRAM OXALATE 5 MG/1
TABLET ORAL
COMMUNITY
Start: 2020-01-03

## 2020-07-20 NOTE — PROGRESS NOTES
Assessment/Plan:    The patient was informed of a stable gyn examination  Because of her history of uterine didelphys 2 Pap smears were performed right left  She will be allowed to continue the birth control pill for contraception  Return my office in 1 year  No problem with depression  She is dealing with COVID well  Subjective:      Patient ID: Rosalba Koenig is a 29 y o  female  HPI    This is a 77-year-old white female, she is a  4 para 1 with 1  section approximately 2 years ago  She has a history of uterine didelphys  She has a history of abnormal Pap smear  She has history of double cervix  Her past abnormal Pap smear was biopsy-proven RADHA 1  Her Pap smear last year was completely normal with no evidence of dysplasia  She will need a Pap smear this year  She currently on birth control pill for contraception this is working well  Sometime she has silent menses this is not an issue  There is no problem with intimacy  Denies any major gynecological  GI complaint  Does have a history of depression/anxiety  She is now on Lexapro this is working well  She is happy with her weight  She has a dentist on a regular basis  There are no new major family illnesses report this time  The following portions of the patient's history were reviewed and updated as appropriate: allergies, current medications, past family history, past medical history, past social history, past surgical history and problem list     Review of Systems   All other systems reviewed and are negative  Objective:      /60   Temp 99 7 °F (37 6 °C)   Ht 5' (1 524 m)   Wt 60 8 kg (134 lb)   LMP 06/15/2020 (Exact Date)   BMI 26 17 kg/m²          Physical Exam   Constitutional: She is oriented to person, place, and time  She appears well-developed and well-nourished  Eyes: Pupils are equal, round, and reactive to light  EOM are normal    Neck: Normal range of motion  Neck supple   No JVD present  No tracheal deviation present  No thyromegaly present  Cardiovascular: Normal rate, regular rhythm, normal heart sounds and intact distal pulses  Exam reveals no gallop and no friction rub  No murmur heard  Pulmonary/Chest: Effort normal and breath sounds normal  No stridor  No respiratory distress  She has no wheezes  She has no rales  She exhibits no tenderness  Right breast exhibits no inverted nipple, no mass, no nipple discharge, no skin change and no tenderness  Left breast exhibits no inverted nipple, no mass, no nipple discharge, no skin change and no tenderness  No breast swelling, tenderness, discharge or bleeding  Breasts are symmetrical    Abdominal: Soft  Bowel sounds are normal  She exhibits no distension and no mass  There is no hepatosplenomegaly  There is no tenderness  There is no rebound and no guarding  No hernia  Hernia confirmed negative in the right inguinal area and confirmed negative in the left inguinal area  Genitourinary: Rectum normal, vagina normal and uterus normal  No labial fusion  There is no rash, tenderness, lesion or injury on the right labia  There is no rash, tenderness, lesion or injury on the left labia  Uterus is not deviated, not enlarged, not fixed and not tender  Cervix exhibits no motion tenderness, no discharge and no friability  Right adnexum displays no mass, no tenderness and no fullness  Left adnexum displays no mass, no tenderness and no fullness  No erythema, tenderness or bleeding in the vagina  No foreign body in the vagina  No signs of injury around the vagina  No vaginal discharge found  Genitourinary Comments: The patient has a uterine didelphys, she is to cervix  A Pap smear is cervix was sent right left  She has a history of RADHA 1  The uterus is normal size  The adnexa clear bilaterally  There is no prolapse  The vagina is clean  Musculoskeletal: Normal range of motion  Lymphadenopathy:     She has no cervical adenopathy   No inguinal adenopathy noted on the right or left side  Neurological: She is alert and oriented to person, place, and time  Skin: Skin is warm and dry  Psychiatric: She has a normal mood and affect   Her behavior is normal  Thought content normal

## 2020-07-23 LAB
CLINICAL INFO: ABNORMAL
CLINICAL INFO: ABNORMAL
CYTO CVX: ABNORMAL
CYTO CVX: ABNORMAL
CYTOLOGY CMNT CVX/VAG CYTO-IMP: ABNORMAL
CYTOLOGY CMNT CVX/VAG CYTO-IMP: ABNORMAL
DATE PREVIOUS BX: ABNORMAL
DATE PREVIOUS BX: ABNORMAL
GEN CATEG CVX/VAG CYTO-IMP: ABNORMAL
GEN CATEG CVX/VAG CYTO-IMP: ABNORMAL
HPV E6+E7 MRNA CVX QL NAA+PROBE: DETECTED
HPV E6+E7 MRNA CVX QL NAA+PROBE: DETECTED
LMP START DATE: ABNORMAL
LMP START DATE: ABNORMAL
SL AMB PREV. PAP:: ABNORMAL
SL AMB PREV. PAP:: ABNORMAL
SPECIMEN SOURCE CVX/VAG CYTO: ABNORMAL
SPECIMEN SOURCE CVX/VAG CYTO: ABNORMAL

## 2020-07-28 ENCOUNTER — TELEPHONE (OUTPATIENT)
Dept: OBGYN CLINIC | Facility: CLINIC | Age: 35
End: 2020-07-28

## 2020-08-18 ENCOUNTER — PROCEDURE VISIT (OUTPATIENT)
Dept: OBGYN CLINIC | Facility: CLINIC | Age: 35
End: 2020-08-18
Payer: COMMERCIAL

## 2020-08-18 VITALS
HEIGHT: 60 IN | DIASTOLIC BLOOD PRESSURE: 60 MMHG | BODY MASS INDEX: 26.31 KG/M2 | TEMPERATURE: 98 F | SYSTOLIC BLOOD PRESSURE: 110 MMHG | WEIGHT: 134 LBS

## 2020-08-18 DIAGNOSIS — R87.612 LGSIL ON PAP SMEAR OF CERVIX: Primary | ICD-10-CM

## 2020-08-18 PROCEDURE — 57455 BIOPSY OF CERVIX W/SCOPE: CPT | Performed by: OBSTETRICS & GYNECOLOGY

## 2020-08-18 NOTE — PATIENT INSTRUCTIONS
The patient has a history of uterine didelphys with a double cervix  Colposcopy was performed the cervix separately  Impressions by low-grade MAMTA of the cervix  Biopsies taken  Bleeding was well controlled with Monsel's  The patient tolerated procedure well  She had arrange a virtual visit for discussion of pathology in 10 days 
Name band;

## 2020-08-18 NOTE — PROGRESS NOTES
This patient has a uterine didelphys, with a double cervix  She had an abnormal Pap smear eat cervix  The cervix will be described as left and right  A colposcopy was performed on each cervix  The specimens were sent separately  This is a double colposcopy  The 1st colposcopy was of the right use cervix  The 2nd colposcopy was of the left cervix    Colposcopy    Date/Time: 8/18/2020 8:36 AM  Performed by: Olivia Cates MD  Authorized by: Olivia Cates MD     Consent:     Consent obtained:  Verbal and written    Consent given by:  Patient    Procedural risks discussed:  Bleeding    Patient questions answered: yes      Patient agrees, verbalizes understanding, and wants to proceed: yes      Educational handouts given: no      Instructions and paperwork completed: yes    Pre-procedure:     Pre-procedure timeout performed: yes      Prepped with: acetic acid    Indication:     Indication:  LSIL  Procedure:     Procedure: Colposcopy w/ biopsy of cervix      Under satisfactory analgesia the patient was prepped and draped in the dorsal lithotomy position: yes      Masury speculum was placed in the vagina: yes      Under colposcopic examination the transition zone was seen in entirety: yes      Intracervical block was performed: no      Cervical biopsy performed with a cervical biopsy punch: yes      Tampon inserted: no      Monsel's solution was applied: yes      Biopsy(s): yes      Location:  6,12 till the right cervix, 6:00 a m  For the left cervix    Specimen to pathology: yes    Post-procedure:     Findings: White epithelium      Impression: Low grade cervical dysplasia    Comments: On the right cervix there is areas of white epithelium at the 6 and 12 o'clock position these areas were biopsies  On the left cervix there was white epithelium at the 6 o'clock position this was also biopsy  We performed a ECC on the right cervix    We were unable to perform an ECC in the left cervix secondary to cervical stenosis  Impressions by low-grade MAMTA  Bleeding was controlled with Monsel's  The patient tolerated procedure well  Or arrange a virtual visit discuss the pathology in 10 days

## 2020-08-21 LAB
PATH REPORT.FINAL DX SPEC: NORMAL
PROCEDURE TYPE: NORMAL
SPECIMEN SOURCE: NORMAL

## 2020-08-28 ENCOUNTER — TELEMEDICINE (OUTPATIENT)
Dept: OBGYN CLINIC | Facility: CLINIC | Age: 35
End: 2020-08-28
Payer: COMMERCIAL

## 2020-08-28 DIAGNOSIS — R87.610 ATYPICAL SQUAMOUS CELLS OF UNDETERMINED SIGNIFICANCE ON CYTOLOGIC SMEAR OF CERVIX (ASC-US): Primary | ICD-10-CM

## 2020-08-28 PROCEDURE — 99213 OFFICE O/P EST LOW 20 MIN: CPT | Performed by: OBSTETRICS & GYNECOLOGY

## 2020-08-28 NOTE — PROGRESS NOTES
Virtual Regular Visit      Assessment/Plan:    Problem List Items Addressed This Visit     None               Reason for visit is   Chief Complaint   Patient presents with    Virtual Regular Visit        Encounter provider Arcadio Ontiveros MD    Provider located at [address] [place] [id number] [phone]      Recent Visits  No visits were found meeting these conditions  Showing recent visits within past 7 days and meeting all other requirements     Future Appointments  No visits were found meeting these conditions  Showing future appointments within next 150 days and meeting all other requirements        The patient was identified by name and date of birth  Clif Wade was informed that this is a telemedicine visit and that the visit is being conducted through [employer]  My office door was closed  No one else was in the room  She acknowledged consent and understanding of privacy and security of the video platform  The patient has agreed to participate and understands they can discontinue the visit at any time  Patient is aware this is a billable service  Subjective  Clif Wade is a 29 y o  female , she had and history of uterine didelphys with double cervix  She had a abnormal Pap smear positive HPV for both of her cervix   Colposcopy was performed with biopsies  The biopsies report showed no dysplasia, squamous atypia  HPI   The pathology of the colposcopy of both the 1st cervix revealed squamous atypia with no dysplasia  This was explained to the patient  Will repeat a Pap smear in 6 months  No further therapy required at this time        Past Medical History:   Diagnosis Date    Abnormal Pap smear of cervix 04/2016    HPV +, colposcopy, follow-up 6 months later was WNL    Asthma     childhood, uses rescue inhaler-only couple of times a year    Complication of anesthesia     nausea    Depression     Diabetes mellitus ([facility] [address] )     GDM    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, IVF pregnancy 3 rounds     HPV (human papilloma virus) infection 2016    colpo done, WNL at 6 month f/u    Hypothyroidism 2017    pt taking synthyroid 75mcg    Recurrent pregnancy loss, antepartum condition or complication     SAB - 6wks, and 2 chemical pregnancies during IVF process    Rh incompatibility     Varicella     had disease       Past Surgical History:   Procedure Laterality Date    CHOLECYSTECTOMY      NC  DELIVERY ONLY N/A 8/10/2018    Procedure:  SECTION (); Surgeon: Aishwarya Matthews MD;  [facility];  Service: Obstetrics    REMOVAL VAGINAL SEPTUM         Current Outpatient Medications   Medication Sig Dispense Refill    ACCU-CHEK FASTCLIX LANCETS MISC Use 4 a day  (Patient not taking: Reported on 2018 ) 102 each 3    ACCU-CHEK FASTCLIX LANCETS MISC USE 4 A DAY (Patient not taking: Reported on 2018) 102 each 1    escitalopram (LEXAPRO) 5 mg tablet 1/2 tab daily for 7 days then 1 qd with food      Ferrous Sulfate (IRON) 325 (65 Fe) MG TABS Take 325 mg by mouth daily      ibuprofen (MOTRIN) 100 mg/5 mL suspension Take 30 mL (600 mg total) by mouth every 6 (six) hours as needed (cramping) (Patient not taking: Reported on 2018 )  0    Insulin Pen Needle 32G X 4 MM MISC Use a directed; inject 2 times per day   (Patient not taking: Reported on 2018 ) 100 each 1    levothyroxine 100 mcg tablet Take 1 tablet (100 mcg total) by mouth daily (Patient taking differently: Take 75 mcg by mouth daily ) 30 tablet 0    norethindrone-ethinyl estradiol-iron (Tilia Fe) 1-20/1-30/1-35 MG-MCG TABS Take 1 tablet by mouth daily 84 tablet 3    Prenatal Vit-Fe Fumarate-FA (PRENATAL VITAMIN PO) Take by mouth      PROAIR  (90 Base) MCG/ACT inhaler TAKE 1-2 PUFFS 4 TIMES A DAY AS NEEDED SHORTNESS OF BREATH (Patient not taking: Reported on 2018) 8 5 Inhaler 0     No current facility-administered medications for this visit  Allergies   Allergen Reactions    Metronidazole Myalgia     leg cramps    Adhesive [Medical Tape] Itching and Rash    Gluten Meal GI Intolerance       Review of Systems  All negative  Video Exam  The patient is alert and orient x3  All questions were answered  There were no vitals filed for this visit  Physical Exam     I spent 5 minutes directly with the patient during this visit      VIRTUAL VISIT DISCLAIMER    Amanda Duenas acknowledges that she has consented to an online visit or consultation  She understands that the online visit is based solely on information provided by her, and that, in the absence of a face-to-face physical evaluation by the physician, the diagnosis she receives is both limited and provisional in terms of accuracy and completeness  This is not intended to replace a full medical face-to-face evaluation by the physician  Amanda Yulissa understands and accepts these terms

## 2020-08-28 NOTE — PATIENT INSTRUCTIONS
The patient was informed of a stable benign biopsies of both for double cervix  Repeat Pap smear in 6 months

## 2020-10-08 ENCOUNTER — NURSE TRIAGE (OUTPATIENT)
Dept: OTHER | Facility: OTHER | Age: 35
End: 2020-10-08

## 2020-10-08 DIAGNOSIS — Z20.828 EXPOSURE TO SARS-ASSOCIATED CORONAVIRUS: Primary | ICD-10-CM

## 2020-10-08 DIAGNOSIS — Z20.828 EXPOSURE TO SARS-ASSOCIATED CORONAVIRUS: ICD-10-CM

## 2020-10-08 PROCEDURE — U0003 INFECTIOUS AGENT DETECTION BY NUCLEIC ACID (DNA OR RNA); SEVERE ACUTE RESPIRATORY SYNDROME CORONAVIRUS 2 (SARS-COV-2) (CORONAVIRUS DISEASE [COVID-19]), AMPLIFIED PROBE TECHNIQUE, MAKING USE OF HIGH THROUGHPUT TECHNOLOGIES AS DESCRIBED BY CMS-2020-01-R: HCPCS | Performed by: FAMILY MEDICINE

## 2020-10-10 ENCOUNTER — TELEPHONE (OUTPATIENT)
Dept: OTHER | Facility: OTHER | Age: 35
End: 2020-10-10

## 2020-10-10 LAB — SARS-COV-2 RNA SPEC QL NAA+PROBE: NOT DETECTED

## 2020-11-24 ENCOUNTER — TRANSCRIBE ORDERS (OUTPATIENT)
Dept: RADIOLOGY | Facility: HOSPITAL | Age: 35
End: 2020-11-24

## 2020-11-24 ENCOUNTER — HOSPITAL ENCOUNTER (OUTPATIENT)
Dept: RADIOLOGY | Facility: HOSPITAL | Age: 35
Discharge: HOME | End: 2020-11-24
Payer: COMMERCIAL

## 2020-11-24 DIAGNOSIS — J45.909 ASTHMATIC BRONCHITIS WITHOUT COMPLICATION, UNSPECIFIED ASTHMA SEVERITY, UNSPECIFIED WHETHER PERSISTENT: Primary | ICD-10-CM

## 2020-11-24 DIAGNOSIS — J45.909 ASTHMATIC BRONCHITIS WITHOUT COMPLICATION, UNSPECIFIED ASTHMA SEVERITY, UNSPECIFIED WHETHER PERSISTENT: ICD-10-CM

## 2020-11-24 PROCEDURE — 71046 X-RAY EXAM CHEST 2 VIEWS: CPT

## 2021-03-26 DIAGNOSIS — Z23 ENCOUNTER FOR IMMUNIZATION: ICD-10-CM

## 2021-05-09 ENCOUNTER — APPOINTMENT (EMERGENCY)
Dept: RADIOLOGY | Facility: HOSPITAL | Age: 36
End: 2021-05-09
Payer: COMMERCIAL

## 2021-05-09 ENCOUNTER — HOSPITAL ENCOUNTER (EMERGENCY)
Facility: HOSPITAL | Age: 36
Discharge: HOME | End: 2021-05-09
Attending: EMERGENCY MEDICINE | Admitting: EMERGENCY MEDICINE
Payer: COMMERCIAL

## 2021-05-09 VITALS
RESPIRATION RATE: 18 BRPM | DIASTOLIC BLOOD PRESSURE: 76 MMHG | WEIGHT: 134 LBS | BODY MASS INDEX: 26.31 KG/M2 | TEMPERATURE: 99 F | OXYGEN SATURATION: 98 % | HEART RATE: 90 BPM | SYSTOLIC BLOOD PRESSURE: 114 MMHG | HEIGHT: 60 IN

## 2021-05-09 DIAGNOSIS — N94.89 PELVIC CONGESTION SYNDROME: ICD-10-CM

## 2021-05-09 DIAGNOSIS — K52.9 COLITIS: Primary | ICD-10-CM

## 2021-05-09 LAB
ALBUMIN SERPL BCP-MCNC: 3.5 G/DL (ref 3.5–5)
ALP SERPL-CCNC: 60 U/L (ref 46–116)
ALT SERPL W P-5'-P-CCNC: 18 U/L (ref 12–78)
ANION GAP SERPL CALCULATED.3IONS-SCNC: 7 MMOL/L (ref 4–13)
AST SERPL W P-5'-P-CCNC: 12 U/L (ref 5–45)
BACTERIA UR QL AUTO: ABNORMAL /HPF
BASOPHILS # BLD AUTO: 0.04 THOUSANDS/ΜL (ref 0–0.1)
BASOPHILS NFR BLD AUTO: 0 % (ref 0–1)
BILIRUB SERPL-MCNC: 0.24 MG/DL (ref 0.2–1)
BILIRUB UR QL STRIP: ABNORMAL
BUN SERPL-MCNC: 11 MG/DL (ref 5–25)
CALCIUM SERPL-MCNC: 9.1 MG/DL (ref 8.3–10.1)
CHLORIDE SERPL-SCNC: 106 MMOL/L (ref 100–108)
CLARITY UR: ABNORMAL
CO2 SERPL-SCNC: 25 MMOL/L (ref 21–32)
COLOR UR: ABNORMAL
CREAT SERPL-MCNC: 0.71 MG/DL (ref 0.6–1.3)
EOSINOPHIL # BLD AUTO: 0.01 THOUSAND/ΜL (ref 0–0.61)
EOSINOPHIL NFR BLD AUTO: 0 % (ref 0–6)
ERYTHROCYTE [DISTWIDTH] IN BLOOD BY AUTOMATED COUNT: 12.2 % (ref 11.6–15.1)
EXT PREG TEST URINE: NEGATIVE
EXT. CONTROL ED NAV: NORMAL
GFR SERPL CREATININE-BSD FRML MDRD: 111 ML/MIN/1.73SQ M
GLUCOSE SERPL-MCNC: 87 MG/DL (ref 65–140)
GLUCOSE UR STRIP-MCNC: NEGATIVE MG/DL
HCT VFR BLD AUTO: 42.5 % (ref 34.8–46.1)
HGB BLD-MCNC: 14.3 G/DL (ref 11.5–15.4)
HGB UR QL STRIP.AUTO: ABNORMAL
IMM GRANULOCYTES # BLD AUTO: 0.06 THOUSAND/UL (ref 0–0.2)
IMM GRANULOCYTES NFR BLD AUTO: 1 % (ref 0–2)
KETONES UR STRIP-MCNC: ABNORMAL MG/DL
LEUKOCYTE ESTERASE UR QL STRIP: NEGATIVE
LIPASE SERPL-CCNC: 208 U/L (ref 73–393)
LYMPHOCYTES # BLD AUTO: 1.15 THOUSANDS/ΜL (ref 0.6–4.47)
LYMPHOCYTES NFR BLD AUTO: 11 % (ref 14–44)
MCH RBC QN AUTO: 31 PG (ref 26.8–34.3)
MCHC RBC AUTO-ENTMCNC: 33.6 G/DL (ref 31.4–37.4)
MCV RBC AUTO: 92 FL (ref 82–98)
MONOCYTES # BLD AUTO: 0.75 THOUSAND/ΜL (ref 0.17–1.22)
MONOCYTES NFR BLD AUTO: 7 % (ref 4–12)
NEUTROPHILS # BLD AUTO: 8.58 THOUSANDS/ΜL (ref 1.85–7.62)
NEUTS SEG NFR BLD AUTO: 81 % (ref 43–75)
NITRITE UR QL STRIP: NEGATIVE
NON-SQ EPI CELLS URNS QL MICRO: ABNORMAL /HPF
NRBC BLD AUTO-RTO: 0 /100 WBCS
PH UR STRIP.AUTO: 6 [PH]
PLATELET # BLD AUTO: 274 THOUSANDS/UL (ref 149–390)
PMV BLD AUTO: 8.6 FL (ref 8.9–12.7)
POTASSIUM SERPL-SCNC: 3.5 MMOL/L (ref 3.5–5.3)
PROT SERPL-MCNC: 8.1 G/DL (ref 6.4–8.2)
PROT UR STRIP-MCNC: ABNORMAL MG/DL
RBC # BLD AUTO: 4.61 MILLION/UL (ref 3.81–5.12)
RBC #/AREA URNS AUTO: ABNORMAL /HPF
SODIUM SERPL-SCNC: 138 MMOL/L (ref 136–145)
SP GR UR STRIP.AUTO: 1.03 (ref 1–1.03)
UROBILINOGEN UR QL STRIP.AUTO: 0.2 E.U./DL
WBC # BLD AUTO: 10.59 THOUSAND/UL (ref 4.31–10.16)
WBC #/AREA URNS AUTO: ABNORMAL /HPF

## 2021-05-09 PROCEDURE — 85025 COMPLETE CBC W/AUTO DIFF WBC: CPT | Performed by: EMERGENCY MEDICINE

## 2021-05-09 PROCEDURE — 36415 COLL VENOUS BLD VENIPUNCTURE: CPT | Performed by: EMERGENCY MEDICINE

## 2021-05-09 PROCEDURE — 99284 EMERGENCY DEPT VISIT MOD MDM: CPT | Performed by: EMERGENCY MEDICINE

## 2021-05-09 PROCEDURE — 74177 CT ABD & PELVIS W/CONTRAST: CPT

## 2021-05-09 PROCEDURE — 81001 URINALYSIS AUTO W/SCOPE: CPT | Performed by: EMERGENCY MEDICINE

## 2021-05-09 PROCEDURE — 96375 TX/PRO/DX INJ NEW DRUG ADDON: CPT

## 2021-05-09 PROCEDURE — 83690 ASSAY OF LIPASE: CPT | Performed by: EMERGENCY MEDICINE

## 2021-05-09 PROCEDURE — 81025 URINE PREGNANCY TEST: CPT | Performed by: EMERGENCY MEDICINE

## 2021-05-09 PROCEDURE — 96361 HYDRATE IV INFUSION ADD-ON: CPT

## 2021-05-09 PROCEDURE — 99284 EMERGENCY DEPT VISIT MOD MDM: CPT

## 2021-05-09 PROCEDURE — G1004 CDSM NDSC: HCPCS

## 2021-05-09 PROCEDURE — 80053 COMPREHEN METABOLIC PANEL: CPT | Performed by: EMERGENCY MEDICINE

## 2021-05-09 PROCEDURE — 96374 THER/PROPH/DIAG INJ IV PUSH: CPT

## 2021-05-09 RX ORDER — AMOXICILLIN AND CLAVULANATE POTASSIUM 875; 125 MG/1; MG/1
1 TABLET, FILM COATED ORAL EVERY 12 HOURS
Qty: 14 TABLET | Refills: 0 | Status: SHIPPED | OUTPATIENT
Start: 2021-05-09 | End: 2021-05-16

## 2021-05-09 RX ORDER — DICYCLOMINE HCL 20 MG
20 TABLET ORAL 2 TIMES DAILY
Qty: 20 TABLET | Refills: 0 | Status: SHIPPED | OUTPATIENT
Start: 2021-05-09

## 2021-05-09 RX ORDER — DICYCLOMINE HCL 20 MG
20 TABLET ORAL ONCE
Status: COMPLETED | OUTPATIENT
Start: 2021-05-09 | End: 2021-05-09

## 2021-05-09 RX ORDER — ONDANSETRON 4 MG/1
4 TABLET, ORALLY DISINTEGRATING ORAL EVERY 6 HOURS PRN
Qty: 12 TABLET | Refills: 0 | Status: SHIPPED | OUTPATIENT
Start: 2021-05-09 | End: 2021-05-12

## 2021-05-09 RX ORDER — AMOXICILLIN AND CLAVULANATE POTASSIUM 875; 125 MG/1; MG/1
1 TABLET, FILM COATED ORAL ONCE
Status: COMPLETED | OUTPATIENT
Start: 2021-05-09 | End: 2021-05-09

## 2021-05-09 RX ORDER — ONDANSETRON 2 MG/ML
4 INJECTION INTRAMUSCULAR; INTRAVENOUS ONCE
Status: COMPLETED | OUTPATIENT
Start: 2021-05-09 | End: 2021-05-09

## 2021-05-09 RX ORDER — KETOROLAC TROMETHAMINE 30 MG/ML
15 INJECTION, SOLUTION INTRAMUSCULAR; INTRAVENOUS ONCE
Status: COMPLETED | OUTPATIENT
Start: 2021-05-09 | End: 2021-05-09

## 2021-05-09 RX ADMIN — ONDANSETRON 4 MG: 2 INJECTION INTRAMUSCULAR; INTRAVENOUS at 10:00

## 2021-05-09 RX ADMIN — DICYCLOMINE HYDROCHLORIDE 20 MG: 20 TABLET ORAL at 12:00

## 2021-05-09 RX ADMIN — IOHEXOL 100 ML: 350 INJECTION, SOLUTION INTRAVENOUS at 10:58

## 2021-05-09 RX ADMIN — KETOROLAC TROMETHAMINE 15 MG: 30 INJECTION, SOLUTION INTRAMUSCULAR at 11:14

## 2021-05-09 RX ADMIN — SODIUM CHLORIDE 1000 ML: 0.9 INJECTION, SOLUTION INTRAVENOUS at 10:03

## 2021-05-09 RX ADMIN — AMOXICILLIN AND CLAVULANATE POTASSIUM 1 TABLET: 875; 125 TABLET, FILM COATED ORAL at 12:00

## 2021-05-09 NOTE — ED ATTENDING ATTESTATION
Final Diagnosis:  1  Colitis    2  Pelvic congestion syndrome      ED Course as of May 09 1454   Sun May 09, 2021   1020 WBC(!): 10 59   1020 Hemoglobin: 14 3   1020 Platelet Count: 252   1143 Bacteria, UA(!): Moderate   1143 RBC, UA(!): 20-30   1143 Ketones, UA(!): >=80 (3+)   1144 Given the significant thickening, will do augmentin + bentyl  Odette Car MD, saw and evaluated the patient  All available labs and X-rays were ordered by me or the resident and have been reviewed by myself  I discussed the patient with the resident / non-physician and agree with the resident's / non-physician practitioner's findings and plan as documented in the resident's / non-physician practicitioner's note, except where noted  At this point, I agree with the current assessment done in the ED  I was present during key portions of all procedures performed unless otherwise stated  Chief Complaint   Patient presents with    Abdominal Pain     Patient reports lower abdominal pain and diarrhea for 2 days     This is a 28 y o  female presenting for evaluation of RLQ pain x2-3 days that's been getting worse  Associated with diarrhea  Nothing in the back is achey  Denies any urinary tract infection symptoms (burning, itching, pain, blood, frequency)  +nausea  +vomiging x2 nbnb  In terms of diarrhea, patient denies sick contacts with similar, recent antibiotics within the last 90 days, and history of C diff  No recent travel outside the state or country  No recent hiking / camping  No blood in stool that the patient noted  On OCPs and has irregular menses but theoretically should be in the next week  Compliant with medications  +GB surgery in the past    Pain somes in waves  The pain is focal in the RLQ but says that it was a "general belly ache" before and is now much more localized       PMH:   has a past medical history of Abnormal Pap smear of cervix (82/8069), Asthma, Complication of anesthesia, Depression, Diabetes mellitus ([facility] [address] ), Female infertility, HPV (human papilloma virus) infection (2016), Hypothyroidism (2017), Recurrent pregnancy loss, antepartum condition or complication (2840), Rh incompatibility, and Varicella  PSH:   has a past surgical history that includes Cholecystectomy (); REMOVAL VAGINAL SEPTUM (); and pr  delivery only (N/A, 8/10/2018)  Social:  Social History     Substance and Sexual Activity   Alcohol Use Yes    Comment: social     Social History     Tobacco Use   Smoking Status Never Smoker   Smokeless Tobacco Never Used     Social History     Substance and Sexual Activity   Drug Use No     PE:  Vitals:    21 0941 21 0948 21 1201   BP: 124/76  114/76   BP Location: Left arm     Pulse: 89  90   Resp: 16  18   Temp:  99 °F (37 2 °C)    TempSrc:  Oral    SpO2: 98%  98%   Weight: 60 8 kg (134 lb)     Height: 5' (1 524 m)     General: VSS, NAD, awake, alert  Well-nourished, well-developed  Appears stated age  Head: Normocephalic, atraumatic, nontender  Eyes: PERRL, EOM-I  No diplopia  No hyphema  No subconjunctival hemorrhages  Symmetrical lids  ENTAtraumatic external nose and ears  MMM  No stridor  Normal phonation  No drooling  Base of mouth is soft  No mastoid tenderness  Neck: Symmetric, trachea midline  No JVD  CV: Peripheral pulses +2 throughout  No chest wall tenderness  Lungs:   Unlabored   No retractions  No crepitus  No tachypnea  No paradoxical motion  Abd: +BS, soft,   RLQ tenderness w/o rebound/guarding  ND  No guarding  No rigidity  No rebound  No peritoneal signs  Psoas/obturator/heel strike signs are absent  MSK:   FROM   No lower extremity edema  Back:   No CVAT  Skin: Dry, intact  Neuro: AAOx3, GCS 15, CN II-XII grossly intact  Motor grossly intact    Psychiatric/Behavioral: Appropriate mood and affect   Exam: deferred  A:  - belly pain  P:  - She has migration of the pain and it is focal in the RLQ   - Discussed labs, imaging (CT scan)  - Disposition per imaging    - 13 point ROS was performed and all are normal unless stated in the history above  - Nursing note reviewed  Vitals reviewed  - Orders placed by myself and/or advanced practitioner / resident     - Previous chart was reviewed  - No language barrier    - History obtained from patient  - There are no limitations to the history obtained  - Critical care time: Not applicable for this patient  Code Status: Prior  Advance Directive and Living Will:      Power of :    POLST:      Medications   ondansetron (ZOFRAN) injection 4 mg (4 mg Intravenous Given 5/9/21 1000)   sodium chloride 0 9 % bolus 1,000 mL (0 mL Intravenous Stopped 5/9/21 1203)   iohexol (OMNIPAQUE) 350 MG/ML injection (MULTI-DOSE) 100 mL (100 mL Intravenous Given 5/9/21 1058)   ketorolac (TORADOL) injection 15 mg (15 mg Intravenous Given 5/9/21 1114)   amoxicillin-clavulanate (AUGMENTIN) 875-125 mg per tablet 1 tablet (1 tablet Oral Given 5/9/21 1200)   dicyclomine (BENTYL) tablet 20 mg (20 mg Oral Given 5/9/21 1200)     CT abdomen pelvis with contrast   Final Result      1  Diffuse colonic mural thickening indicating colitis from infectious or inflammatory etiology  No acute appendicitis  2   Engorgement ovarian and adnexal veins  Correlate for pelvic congestion syndrome        3   Hepatomegaly            Workstation performed: [id number]           Orders Placed This Encounter   Procedures    CT abdomen pelvis with contrast    CBC and differential    Comprehensive metabolic panel    Lipase    UA w Reflex to Microscopic w Reflex to Culture    Urine Microscopic    Ambulatory referral to Gastroenterology    Insert peripheral IV    POCT pregnancy, urine     Labs Reviewed   CBC AND DIFFERENTIAL - Abnormal       Result Value Ref Range Status    WBC 10 59 (*) 4 31 - 10 16 Thousand/uL Final    RBC 4 61  3 81 - 5 12 Million/uL Final Hemoglobin 14 3  11 5 - 15 4 g/dL Final    Hematocrit 42 5  34 8 - 46 1 % Final    MCV 92  82 - 98 fL Final    MCH 31 0  26 8 - 34 3 pg Final    MCHC 33 6  31 4 - 37 4 g/dL Final    RDW 12 2  11 6 - 15 1 % Final    MPV 8 6 (*) 8 9 - 12 7 fL Final    Platelets 283  159 - 390 Thousands/uL Final    nRBC 0  /100 WBCs Final    Neutrophils Relative 81 (*) 43 - 75 % Final    Immat GRANS % 1  0 - 2 % Final    Lymphocytes Relative 11 (*) 14 - 44 % Final    Monocytes Relative 7  4 - 12 % Final    Eosinophils Relative 0  0 - 6 % Final    Basophils Relative 0  0 - 1 % Final    Neutrophils Absolute 8 58 (*) 1 85 - 7 62 Thousands/µL Final    Immature Grans Absolute 0 06  0 00 - 0 20 Thousand/uL Final    Lymphocytes Absolute 1 15  0 60 - 4 47 Thousands/µL Final    Monocytes Absolute 0 75  0 17 - 1 22 Thousand/µL Final    Eosinophils Absolute 0 01  0 00 - 0 61 Thousand/µL Final    Basophils Absolute 0 04  0 00 - 0 10 Thousands/µL Final   UA W REFLEX TO MICROSCOPIC WITH REFLEX TO CULTURE - Abnormal    Color, UA Dk Yellow   Final    Clarity, UA Cloudy   Final    Specific Gravity, UA 1 028  1 003 - 1 030 Final    pH, UA 6 0  4 5, 5 0, 5 5, 6 0, 6 5, 7 0, 7 5, 8 0 Final    Leukocytes, UA Negative  Negative Final    Nitrite, UA Negative  Negative Final    Protein, UA 30 (1+) (*) Negative mg/dl Final    Glucose, UA Negative  Negative mg/dl Final    Ketones, UA >=80 (3+) (*) Negative mg/dl Final    Urobilinogen, UA 0 2  0 2, 1 0 E U /dl E U /dl Final    Bilirubin, UA Interference- unable to analyze (*) Negative Final    Comment: The dipstick result may be falsely positive due to interfering substances  We recommend reliance upon serum bilirubin, liver & kidney function tests to guide patient care if clinically indicated      Blood, UA Large (*) Negative Final   URINE MICROSCOPIC - Abnormal    RBC, UA 20-30 (*) None Seen, 2-4 /hpf Final    WBC, UA 2-4  None Seen, 2-4 /hpf Final    Epithelial Cells Moderate (*) None Seen, Occasional /hpf Final    Bacteria, UA Moderate (*) None Seen, Occasional /hpf Final   LIPASE - Normal    Lipase 208  73 - 393 u/L Final   POCT PREGNANCY, URINE - Normal    EXT PREG TEST UR (Ref: Negative) negative   Final    Control valid   Final   COMPREHENSIVE METABOLIC PANEL    Sodium 982  136 - 145 mmol/L Final    Potassium 3 5  3 5 - 5 3 mmol/L Final    Chloride 106  100 - 108 mmol/L Final    CO2 25  21 - 32 mmol/L Final    ANION GAP 7  4 - 13 mmol/L Final    BUN 11  5 - 25 mg/dL Final    Creatinine 0 71  0 60 - 1 30 mg/dL Final    Comment: Standardized to IDMS reference method    Glucose 87  65 - 140 mg/dL Final    Comment: If the patient is fasting, the ADA then defines impaired fasting glucose as > 100 mg/dL and diabetes as > or equal to 123 mg/dL  Specimen collection should occur prior to Sulfasalazine administration due to the potential for falsely depressed results  Specimen collection should occur prior to Sulfapyridine administration due to the potential for falsely elevated results  Calcium 9 1  8 3 - 10 1 mg/dL Final    AST 12  5 - 45 U/L Final    Comment: Specimen collection should occur prior to Sulfasalazine administration due to the potential for falsely depressed results  ALT 18  12 - 78 U/L Final    Comment: Specimen collection should occur prior to Sulfasalazine and/or Sulfapyridine administration due to the potential for falsely depressed results  Alkaline Phosphatase 60  46 - 116 U/L Final    Total Protein 8 1  6 4 - 8 2 g/dL Final    Albumin 3 5  3 5 - 5 0 g/dL Final    Total Bilirubin 0 24  0 20 - 1 00 mg/dL Final    Comment: Use of this assay is not recommended for patients undergoing treatment with eltrombopag due to the potential for falsely elevated results      eGFR 111  ml/min/1 73sq m Final    Narrative:     [place] guidelines for Chronic Kidney Disease (CKD):     Stage 1 with normal or high GFR (GFR > 90 mL/min/1 73 square meters)    Stage 2 Mild CKD (GFR = 60-89 mL/min/1 73 square meters)    Stage 3A Moderate CKD (GFR = 45-59 mL/min/1 73 square meters)    Stage 3B Moderate CKD (GFR = 30-44 mL/min/1 73 square meters)    Stage 4 Severe CKD (GFR = 15-29 mL/min/1 73 square meters)    Stage 5 End Stage CKD (GFR <15 mL/min/1 73 square meters)  Note: GFR calculation is accurate only with a steady state creatinine     Time reflects when diagnosis was documented in both MDM as applicable and the Disposition within this note     Time User Action Codes Description Comment    5/9/2021 11:53 AM Katrin Sports Add [K52 9] Colitis     5/9/2021 12:01 PM Katrin Sports Add [N94 89] Pelvic congestion syndrome       ED Disposition     ED Disposition Condition Date/Time Comment    Discharge Stable Sun May 9, 2021 11:53 AM Radha Robles discharge to home/self care  Follow-up Information     Follow up With Specialties Details Why Contact Info Additional 7000 [facility] 287,  Internal Medicine   [address] Gastroenterology SPECIALISTS [facility] Gastroenterology Schedule an appointment as soon as possible for a visit   709 [facility] 3300 [facility] [address] [phone] [facility] Gastroenterology Specialists [address], [address]    1840 [facility] Emergency Department Emergency Medicine Go to  As needed, If symptoms worsen [address]  [address] [place] [address] 64 [facility] Emergency Department, [address] [place] [address], [place], [address] [place] [address] 108    Rahel Mai MD Gastroenterology   [address]  [facility]    [address] [facility]           Discharge Medication List as of 5/9/2021 12:01 PM      START taking these medications    Details   amoxicillin-clavulanate (AUGMENTIN) 875-125 mg per tablet Take 1 tablet by mouth every 12 (twelve) hours for 7 days, Starting Sun 5/9/2021, Until Sun 5/16/2021, Normal      dicyclomine (BENTYL) 20 mg tablet Take 1 tablet (20 mg total) by mouth 2 (two) times a day, Starting Sun 5/9/2021, Normal      ondansetron (ZOFRAN-ODT) 4 mg disintegrating tablet Take 1 tablet (4 mg total) by mouth every 6 (six) hours as needed for nausea or vomiting for up to 3 days, Starting Sun 5/9/2021, Until Wed 5/12/2021, Normal         CONTINUE these medications which have NOT CHANGED    Details   ! ! ACCU-CHEK FASTCLIX LANCETS MISC Use 4 a day , Normal      !! ACCU-CHEK FASTCLIX LANCETS MISC USE 4 A DAY, Normal      escitalopram (LEXAPRO) 5 mg tablet 1/2 tab daily for 7 days then 1 qd with food, Historical Med      Ferrous Sulfate (IRON) 325 (65 Fe) MG TABS Take 325 mg by mouth daily, Historical Med      ibuprofen (MOTRIN) 100 mg/5 mL suspension Take 30 mL (600 mg total) by mouth every 6 (six) hours as needed (cramping), Starting Mon 8/13/2018, Print      Insulin Pen Needle 32G X 4 MM MISC Use a directed; inject 2 times per day , Normal      levothyroxine 100 mcg tablet Take 1 tablet (100 mcg total) by mouth daily, Starting Sat 11/3/2018, Normal      norethindrone-ethinyl estradiol-iron (Tilia Fe) 1-20/1-30/1-35 MG-MCG TABS Take 1 tablet by mouth daily, Starting Mon 7/20/2020, Normal      Prenatal Vit-Fe Fumarate-FA (PRENATAL VITAMIN PO) Take by mouth, Historical Med      PROAIR  (90 Base) MCG/ACT inhaler TAKE 1-2 PUFFS 4 TIMES A DAY AS NEEDED SHORTNESS OF BREATH, Normal       !! - Potential duplicate medications found  Please discuss with provider  Prior to Admission Medications   Prescriptions Last Dose Informant Patient Reported? Taking? ACCU-CHEK FASTCLIX LANCETS MISC  Self No No   Sig: Use 4 a day     Patient not taking: Reported on 8/29/2018    ACCU-CHEK FASTCLIX LANCETS MISC  Self No No   Sig: USE 4 A DAY   Patient not taking: Reported on 8/29/2018   Ferrous Sulfate (IRON) 325 (65 Fe) MG TABS Self Yes No   Sig: Take 325 mg by mouth daily   Insulin Pen Needle 32G X 4 MM MISC  Self No No   Sig: Use a directed; inject 2 times per day  Patient not taking: Reported on 2018    PROAIR  (90 Base) MCG/ACT inhaler  Self No No   Sig: TAKE 1-2 PUFFS 4 TIMES A DAY AS NEEDED SHORTNESS OF BREATH   Patient not taking: Reported on 2018   Prenatal Vit-Fe Fumarate-FA (PRENATAL VITAMIN PO)  Self Yes No   Sig: Take by mouth   escitalopram (LEXAPRO) 5 mg tablet   Yes No   Si/2 tab daily for 7 days then 1 qd with food   ibuprofen (MOTRIN) 100 mg/5 mL suspension   No No   Sig: Take 30 mL (600 mg total) by mouth every 6 (six) hours as needed (cramping)   Patient not taking: Reported on 2018    levothyroxine 100 mcg tablet  Self No No   Sig: Take 1 tablet (100 mcg total) by mouth daily   Patient taking differently: Take 75 mcg by mouth daily    norethindrone-ethinyl estradiol-iron (Tilia Fe) 1-20/1-30/1-35 MG-MCG TABS   No No   Sig: Take 1 tablet by mouth daily      Facility-Administered Medications: None       Portions of the record may have been created with voice recognition software  Occasional wrong word or "sound a like" substitutions may have occurred due to the inherent limitations of voice recognition software  Read the chart carefully and recognize, using context, where substitutions have occurred      Electronically signed by:  Clara Tejada

## 2021-05-09 NOTE — ED PROVIDER NOTES
History  Chief Complaint   Patient presents with    Abdominal Pain     Patient reports lower abdominal pain and diarrhea for 2 days     27 yo female presenting for approximately 3 days of abdominal pain that started near her belly button but now is in the RLQ with associated nausea and diarrhea which she describes as non-bloody  She states never had pain like this before and only prior abdominal surgery was cholecystectomy  Denies recent illness, fevers, night sweats, CP/SOB, vomiting, dysuria, hematuria, increased urinary freq/urgency, vaginal discharge  States she takes birth control so her periods are not as regular  Prior to Admission Medications   Prescriptions Last Dose Informant Patient Reported? Taking? ACCU-CHEK FASTCLIX LANCETS MISC  Self No No   Sig: Use 4 a day  Patient not taking: Reported on 2018    ACCU-CHEK FASTCLIX LANCETS MISC  Self No No   Sig: USE 4 A DAY   Patient not taking: Reported on 2018   Ferrous Sulfate (IRON) 325 (65 Fe) MG TABS  Self Yes No   Sig: Take 325 mg by mouth daily   Insulin Pen Needle 32G X 4 MM MISC  Self No No   Sig: Use a directed; inject 2 times per day     Patient not taking: Reported on 2018    PROAIR  (90 Base) MCG/ACT inhaler  Self No No   Sig: TAKE 1-2 PUFFS 4 TIMES A DAY AS NEEDED SHORTNESS OF BREATH   Patient not taking: Reported on 2018   Prenatal Vit-Fe Fumarate-FA (PRENATAL VITAMIN PO)  Self Yes No   Sig: Take by mouth   escitalopram (LEXAPRO) 5 mg tablet   Yes No   Si/2 tab daily for 7 days then 1 qd with food   ibuprofen (MOTRIN) 100 mg/5 mL suspension   No No   Sig: Take 30 mL (600 mg total) by mouth every 6 (six) hours as needed (cramping)   Patient not taking: Reported on 2018    levothyroxine 100 mcg tablet  Self No No   Sig: Take 1 tablet (100 mcg total) by mouth daily   Patient taking differently: Take 75 mcg by mouth daily    norethindrone-ethinyl estradiol-iron (Tilia Fe) 1-20/1-30/1-35 MG-MCG TABS No No   Sig: Take 1 tablet by mouth daily      Facility-Administered Medications: None       Past Medical History:   Diagnosis Date    Abnormal Pap smear of cervix 2016    HPV +, colposcopy, follow-up 6 months later was WNL    Asthma     childhood, uses rescue inhaler-only couple of times a year    Complication of anesthesia     nausea    Depression     Diabetes mellitus ([facility] [address] )     GDM    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, IVF pregnancy 3 rounds     HPV (human papilloma virus) infection 2016    colpo done, WNL at 6 month f/u    Hypothyroidism 2017    pt taking synthyroid 75mcg    Recurrent pregnancy loss, antepartum condition or complication     SAB - 6wks, and 2 chemical pregnancies during IVF process    Rh incompatibility     Varicella     had disease       Past Surgical History:   Procedure Laterality Date    CHOLECYSTECTOMY      SD  DELIVERY ONLY N/A 8/10/2018    Procedure:  SECTION (); Surgeon: Murray Caldwell MD;  Location: [facility];  Service: Obstetrics    REMOVAL VAGINAL SEPTUM         Family History   Problem Relation Age of Onset    Diabetes Mother     Stroke Mother     Arthritis Mother     Vision loss Mother     Hyperlipidemia Father     No Known Problems Brother     Diabetes Maternal Grandmother     Cancer Maternal Grandmother         brst    Heart disease Maternal Grandfather         heart failure    Cancer Paternal Grandfather         lung and brain     I have reviewed and agree with the history as documented      E-Cigarette/Vaping    E-Cigarette Use Never User      E-Cigarette/Vaping Substances    Nicotine No     THC No     CBD No     Flavoring No     Other No     Unknown No      Social History     Tobacco Use    Smoking status: Never Smoker    Smokeless tobacco: Never Used   Substance Use Topics    Alcohol use: Yes     Comment: social    Drug use: No        Review of Systems   Gastrointestinal: Positive for abdominal pain, diarrhea and nausea  Negative for abdominal distention, anal bleeding, blood in stool, constipation, rectal pain and vomiting  All other systems reviewed and are negative  Physical Exam  ED Triage Vitals   Temperature Pulse Respirations Blood Pressure SpO2   05/09/21 0948 05/09/21 0941 05/09/21 0941 05/09/21 0941 05/09/21 0941   99 °F (37 2 °C) 89 16 124/76 98 %      Temp Source Heart Rate Source Patient Position - Orthostatic VS BP Location FiO2 (%)   05/09/21 0948 05/09/21 0941 05/09/21 0941 05/09/21 0941 --   Oral Monitor Sitting Left arm       Pain Score       05/09/21 0941       7             Orthostatic Vital Signs  Vitals:    05/09/21 0941 05/09/21 1201   BP: 124/76 114/76   Pulse: 89 90   Patient Position - Orthostatic VS: Sitting        Physical Exam  Vitals signs and nursing note reviewed  Constitutional:       General: She is not in acute distress  Appearance: She is well-developed  She is not diaphoretic  HENT:      Head: Normocephalic and atraumatic  Right Ear: External ear normal       Left Ear: External ear normal       Nose: Nose normal    Eyes:      General: No scleral icterus  Right eye: No discharge  Left eye: No discharge  Conjunctiva/sclera: Conjunctivae normal       Pupils: Pupils are equal, round, and reactive to light  Neck:      Musculoskeletal: Normal range of motion and neck supple  Vascular: No JVD  Trachea: No tracheal deviation  Cardiovascular:      Rate and Rhythm: Normal rate and regular rhythm  Heart sounds: Normal heart sounds  No murmur  No friction rub  No gallop  Pulmonary:      Effort: Pulmonary effort is normal  No respiratory distress  Breath sounds: Normal breath sounds  No stridor  No wheezing or rales  Abdominal:      General: Bowel sounds are normal  There is no distension  Palpations: Abdomen is soft  There is no mass  Tenderness:  There is abdominal tenderness in the right lower quadrant  There is no right CVA tenderness, left CVA tenderness, guarding or rebound  Positive signs include Rovsing's sign  Negative signs include Art's sign, McBurney's sign, psoas sign and obturator sign  Musculoskeletal: Normal range of motion  General: No tenderness or deformity  Skin:     General: Skin is warm and dry  Coloration: Skin is not pale  Findings: No erythema or rash  Neurological:      Mental Status: She is alert and oriented to person, place, and time  Cranial Nerves: No cranial nerve deficit  Sensory: No sensory deficit  Motor: No abnormal muscle tone  Psychiatric:         Behavior: Behavior normal          Thought Content:  Thought content normal          Judgment: Judgment normal          ED Medications  Medications   ondansetron (ZOFRAN) injection 4 mg (4 mg Intravenous Given 5/9/21 1000)   sodium chloride 0 9 % bolus 1,000 mL (0 mL Intravenous Stopped 5/9/21 1203)   iohexol (OMNIPAQUE) 350 MG/ML injection (MULTI-DOSE) 100 mL (100 mL Intravenous Given 5/9/21 1058)   ketorolac (TORADOL) injection 15 mg (15 mg Intravenous Given 5/9/21 1114)   amoxicillin-clavulanate (AUGMENTIN) 875-125 mg per tablet 1 tablet (1 tablet Oral Given 5/9/21 1200)   dicyclomine (BENTYL) tablet 20 mg (20 mg Oral Given 5/9/21 1200)       Diagnostic Studies  Results Reviewed     Procedure Component Value Units Date/Time    Urine Microscopic [[id number]]  (Abnormal) Collected: 05/09/21 0959    Lab Status: Final result Specimen: Urine, Clean Catch Updated: 05/09/21 1122     RBC, UA 20-30 /hpf      WBC, UA 2-4 /hpf      Epithelial Cells Moderate /hpf      Bacteria, UA Moderate /hpf     POCT pregnancy, urine [[id number]]  (Normal) Resulted: 05/09/21 1000    Lab Status: Final result Updated: 05/09/21 1055     EXT PREG TEST UR (Ref: Negative) negative     Control valid    UA w Reflex to Microscopic w Reflex to Culture [[id number]]  (Abnormal) Collected: 05/09/21 0959    Lab Status: Final result Specimen: Urine, Clean Catch Updated: 05/09/21 1043     Color, UA Dk Yellow     Clarity, UA Cloudy     Specific Gravity, UA 1 028     pH, UA 6 0     Leukocytes, UA Negative     Nitrite, UA Negative     Protein, UA 30 (1+) mg/dl      Glucose, UA Negative mg/dl      Ketones, UA >=80 (3+) mg/dl      Urobilinogen, UA 0 2 E U /dl      Bilirubin, UA Interference- unable to analyze     Blood, UA Large    Comprehensive metabolic panel [[id number]] Collected: 05/09/21 0959    Lab Status: Final result Specimen: Blood from Arm, Left Updated: 05/09/21 1031     Sodium 138 mmol/L      Potassium 3 5 mmol/L      Chloride 106 mmol/L      CO2 25 mmol/L      ANION GAP 7 mmol/L      BUN 11 mg/dL      Creatinine 0 71 mg/dL      Glucose 87 mg/dL      Calcium 9 1 mg/dL      AST 12 U/L      ALT 18 U/L      Alkaline Phosphatase 60 U/L      Total Protein 8 1 g/dL      Albumin 3 5 g/dL      Total Bilirubin 0 24 mg/dL      eGFR 111 ml/min/1 73sq m     Narrative:      [facility] guidelines for Chronic Kidney Disease (CKD):     Stage 1 with normal or high GFR (GFR > 90 mL/min/1 73 square meters)    Stage 2 Mild CKD (GFR = 60-89 mL/min/1 73 square meters)    Stage 3A Moderate CKD (GFR = 45-59 mL/min/1 73 square meters)    Stage 3B Moderate CKD (GFR = 30-44 mL/min/1 73 square meters)    Stage 4 Severe CKD (GFR = 15-29 mL/min/1 73 square meters)    Stage 5 End Stage CKD (GFR <15 mL/min/1 73 square meters)  Note: GFR calculation is accurate only with a steady state creatinine    Lipase [[id number]]  (Normal) Collected: 05/09/21 0959    Lab Status: Final result Specimen: Blood from Arm, Left Updated: 05/09/21 1031     Lipase 208 u/L     CBC and differential [[id number]]  (Abnormal) Collected: 05/09/21 0959    Lab Status: Final result Specimen: Blood from Arm, Left Updated: 05/09/21 1012     WBC 10 59 Thousand/uL      RBC 4 61 Million/uL      Hemoglobin 14 3 g/dL      Hematocrit 42 5 %      MCV 92 fL      MCH 31 0 pg MCHC 33 6 g/dL      RDW 12 2 %      MPV 8 6 fL      Platelets 235 Thousands/uL      nRBC 0 /100 WBCs      Neutrophils Relative 81 %      Immat GRANS % 1 %      Lymphocytes Relative 11 %      Monocytes Relative 7 %      Eosinophils Relative 0 %      Basophils Relative 0 %      Neutrophils Absolute 8 58 Thousands/µL      Immature Grans Absolute 0 06 Thousand/uL      Lymphocytes Absolute 1 15 Thousands/µL      Monocytes Absolute 0 75 Thousand/µL      Eosinophils Absolute 0 01 Thousand/µL      Basophils Absolute 0 04 Thousands/µL                  CT abdomen pelvis with contrast   Final Result by Meaghan Romero MD (05/09 1141)      1  Diffuse colonic mural thickening indicating colitis from infectious or inflammatory etiology  No acute appendicitis  2   Engorgement ovarian and adnexal veins  Correlate for pelvic congestion syndrome  3   Hepatomegaly            Workstation performed: [id number]               Procedures  Procedures      ED Course  ED Course as of May 09 1251   Sun May 09, 2021   1047 Urine preg negative per nursing        1201 Feeling better at thsi time  Stressed follow up with GI and OBGYN for CT findings  MDM  Number of Diagnoses or Management Options  Colitis:   Pelvic congestion syndrome:   Diagnosis management comments: Discussed laboratory and CT results with patient patient's follow-up around the room  And feeling better at this time after treatment  Will provide patient with Augmentin, Bentyl, Zofran  Discussed colitis and pelvic congestion syndrome  Stressed follow-up with GI and OBGYN  Discussed return precautions with patient patient's father they state their understanding of this        Disposition  Final diagnoses:   Colitis   Pelvic congestion syndrome     Time reflects when diagnosis was documented in both MDM as applicable and the Disposition within this note     Time User Action Codes Description Comment    5/9/2021 11:53 AM Fer Zhao [K52 9] Colitis     5/9/2021 12:01 PM Ree Comment Add [A54 36] Pelvic congestion syndrome       ED Disposition     ED Disposition Condition Date/Time Comment    Discharge Stable Sun May 9, 2021 11:53 AM Radha Robles discharge to home/self care  Follow-up Information     Follow up With Specialties Details Why Contact Info Additional [address] [facility], DO Internal Medicine   1705 [facility] [address] Cheryle Fore Gastroenterology SPECIALISTS [facility] Gastroenterology Schedule an appointment as soon as possible for a visit   709 [facility] [address] [facility] [address] [phone] Cheryle Fore Gastroenterology Specialists [facility], [address] [facility],  [address], [place], [place], [address]    [facility] Emergency Department Emergency Medicine Go to  As needed, If symptoms worsen [address]  [address] [facility] 64 [facility], [address] [facility], [place], [place], [address]    Debbie Johnson MD Gastroenterology   [address]  [facility]    [address] [facility]             Patient's Medications   Discharge Prescriptions    AMOXICILLIN-CLAVULANATE (AUGMENTIN) 875-125 MG PER TABLET    Take 1 tablet by mouth every 12 (twelve) hours for 7 days       Start Date: 5/9/2021  End Date: 5/16/2021       Order Dose: 1 tablet       Quantity: 14 tablet    Refills: 0    DICYCLOMINE (BENTYL) 20 MG TABLET    Take 1 tablet (20 mg total) by mouth 2 (two) times a day       Start Date: 5/9/2021  End Date: --       Order Dose: 20 mg       Quantity: 20 tablet    Refills: 0    ONDANSETRON (ZOFRAN-ODT) 4 MG DISINTEGRATING TABLET    Take 1 tablet (4 mg total) by mouth every 6 (six) hours as needed for nausea or vomiting for up to 3 days       Start Date: 5/9/2021  End Date: 5/12/2021       Order Dose: 4 mg Quantity: 12 tablet    Refills: 0         PDMP Review     None           ED Provider  Attending physically available and evaluated Tereza Cancinosteve JACINTO managed the patient along with the ED Attending      Electronically Signed by         Deedee Rivera DO  05/09/21 2947

## 2021-08-07 DIAGNOSIS — Z30.41 ENCOUNTER FOR SURVEILLANCE OF CONTRACEPTIVE PILLS: ICD-10-CM

## 2021-08-08 RX ORDER — NORETHINDRONE ACETATE AND ETHINYL ESTRADIOL 5-7-9-7
KIT ORAL
Qty: 84 TABLET | Refills: 3 | Status: SHIPPED | OUTPATIENT
Start: 2021-08-08 | End: 2022-07-05

## 2021-09-14 ENCOUNTER — ANNUAL EXAM (OUTPATIENT)
Dept: OBGYN CLINIC | Facility: CLINIC | Age: 36
End: 2021-09-14
Payer: COMMERCIAL

## 2021-09-14 VITALS
WEIGHT: 135.8 LBS | SYSTOLIC BLOOD PRESSURE: 124 MMHG | DIASTOLIC BLOOD PRESSURE: 80 MMHG | BODY MASS INDEX: 26.66 KG/M2 | HEIGHT: 60 IN

## 2021-09-14 DIAGNOSIS — Z01.419 WOMEN'S ANNUAL ROUTINE GYNECOLOGICAL EXAMINATION: Primary | ICD-10-CM

## 2021-09-14 PROCEDURE — S0612 ANNUAL GYNECOLOGICAL EXAMINA: HCPCS | Performed by: OBSTETRICS & GYNECOLOGY

## 2021-09-14 RX ORDER — LANSOPRAZOLE 30 MG/1
30 CAPSULE, DELAYED RELEASE ORAL DAILY
COMMUNITY
Start: 2021-06-04 | End: 2022-06-04

## 2021-09-14 NOTE — PROGRESS NOTES
Assessment/Plan:      The patient was informed of a stable gyn examination  Her  scars well healed  She does have a double cervix  With a history of of dysplasia  A separate Pap smear was done the cervix will continue to follow appropriately  The patient is happy with birth control pill for contraception she will continue that  Unsure of future pregnancy  History of infertility  She was positive for COVID  She has done well  She has received the vaccine  She feels safe at home  Her anxiety is under control with medication  She has a dentist on a regular basis  Subjective:      Patient ID: Luly Hernandez is a 39 y o  female  HPI      This is a 51-year-old white female, she is a  4 para 1 with 1  section approximately 3 years ago  She has a history of infertility  She has a history of uterine didelphys and a double cervix  She has a history of abnormal Pap smear she will need a Pap smear today  She was positive for COVID early in the year  She has subsequently gotten the vaccine  She is content with her weight  Denies any major gynecological  GI complaint  Recent colonoscopy we 1st performed for colitis biopsies were negative  She is happy with her weight  There are no new major family illnesses report  She has a dentist on a regular basis  She does have a history of anxiety / depression is currently on Lexapro this is working well  The following portions of the patient's history were reviewed and updated as appropriate: allergies, current medications, past family history, past medical history, past social history, past surgical history and problem list     Review of Systems   All other systems reviewed and are negative  Objective:      /80   Ht 5' (1 524 m)   Wt 61 6 kg (135 lb 12 8 oz)   LMP 2021 (Exact Date)   BMI 26 52 kg/m²          Physical Exam  Vitals reviewed  Exam conducted with a chaperone present  Constitutional:       Appearance: Normal appearance  She is normal weight  HENT:      Head: Normocephalic and atraumatic  Eyes:      Extraocular Movements: Extraocular movements intact  Cardiovascular:      Rate and Rhythm: Normal rate  Pulses: Normal pulses  Heart sounds: Normal heart sounds  Pulmonary:      Effort: Pulmonary effort is normal       Breath sounds: Normal breath sounds  Chest:      Breasts: Breasts are symmetrical          Right: Normal  No swelling, bleeding, inverted nipple, mass, nipple discharge, skin change or tenderness  Left: Normal  No swelling, bleeding, inverted nipple, mass, nipple discharge, skin change or tenderness  Abdominal:      General: Abdomen is flat  Bowel sounds are normal  There is no distension  Palpations: Abdomen is soft  There is no hepatomegaly, splenomegaly or mass  Tenderness: There is no abdominal tenderness  There is no right CVA tenderness, left CVA tenderness, guarding or rebound  Hernia: No hernia is present  There is no hernia in the umbilical area, ventral area, left inguinal area or right inguinal area  Comments:   Pfannenstiel incision from    Section scar which is well-healed  Genitourinary:     General: Normal vulva  Pubic Area: No rash or pubic lice  Labia:         Right: No rash, tenderness, lesion or injury  Left: No rash, tenderness, lesion or injury  Urethra: No prolapse, urethral pain, urethral swelling or urethral lesion  Vagina: Normal  No signs of injury and foreign body  No vaginal discharge, erythema, tenderness, bleeding, lesions or prolapsed vaginal walls  Rectum: Normal       Comments:   Uterine didelphys is present double cervix  A Pap smear was done of the cervix separately  The uterus is normal size is no evidence of prolapse the adnexa clear bilaterally  The vagina is clean  The bladder and urethra well supported    Musculoskeletal: General: Normal range of motion  Lymphadenopathy:      Upper Body:      Right upper body: No supraclavicular or axillary adenopathy  Left upper body: No supraclavicular or axillary adenopathy  Lower Body: No right inguinal adenopathy  No left inguinal adenopathy  Skin:     General: Skin is warm  Neurological:      Mental Status: She is alert  Psychiatric:         Mood and Affect: Mood normal          Behavior: Behavior normal          Thought Content:  Thought content normal

## 2021-09-14 NOTE — PATIENT INSTRUCTIONS
The patient was informed of a stable gyn examination  She will be allowed to continue birth control pill for contraception  She does have a history of dysplasia of the cervix a Pap smear was repeated  Will keep me informed her progress with that  She is happy with her weight  She has received COVID vaccine  She was positive for COVID before the vaccine  She will continue yearly exams  Will continue to follow her Pap smears as needed

## 2021-09-18 LAB
CLINICAL INFO: ABNORMAL
CYTO CVX: ABNORMAL
DATE PREVIOUS BX: ABNORMAL
DATE PREVIOUS BX: ABNORMAL
GEN CATEG CVX/VAG CYTO-IMP: ABNORMAL
GEN CATEG CVX/VAG CYTO-IMP: ABNORMAL
HPV E6+E7 MRNA CVX QL NAA+PROBE: DETECTED
HPV E6+E7 MRNA CVX QL NAA+PROBE: DETECTED
LMP START DATE: ABNORMAL
LMP START DATE: ABNORMAL
SL AMB PREV. PAP:: ABNORMAL
SL AMB PREV. PAP:: ABNORMAL
SPECIMEN SOURCE CVX/VAG CYTO: ABNORMAL
SPECIMEN SOURCE CVX/VAG CYTO: ABNORMAL

## 2022-03-23 ENCOUNTER — APPOINTMENT (OUTPATIENT)
Dept: LAB | Facility: CLINIC | Age: 37
End: 2022-03-23
Payer: COMMERCIAL

## 2022-03-23 DIAGNOSIS — Z00.00 ROUTINE GENERAL MEDICAL EXAMINATION AT A HEALTH CARE FACILITY: ICD-10-CM

## 2022-03-23 DIAGNOSIS — Z13.6 SCREENING FOR ISCHEMIC HEART DISEASE: ICD-10-CM

## 2022-03-23 LAB
ALBUMIN SERPL BCP-MCNC: 3.7 G/DL (ref 3.5–5)
ALP SERPL-CCNC: 61 U/L (ref 46–116)
ALT SERPL W P-5'-P-CCNC: 17 U/L (ref 12–78)
ANION GAP SERPL CALCULATED.3IONS-SCNC: 5 MMOL/L (ref 4–13)
AST SERPL W P-5'-P-CCNC: 12 U/L (ref 5–45)
BILIRUB SERPL-MCNC: 0.3 MG/DL (ref 0.2–1)
BUN SERPL-MCNC: 16 MG/DL (ref 5–25)
CALCIUM SERPL-MCNC: 9.2 MG/DL (ref 8.3–10.1)
CHLORIDE SERPL-SCNC: 106 MMOL/L (ref 100–108)
CHOLEST SERPL-MCNC: 168 MG/DL
CO2 SERPL-SCNC: 28 MMOL/L (ref 21–32)
CREAT SERPL-MCNC: 0.72 MG/DL (ref 0.6–1.3)
GFR SERPL CREATININE-BSD FRML MDRD: 108 ML/MIN/1.73SQ M
GLUCOSE P FAST SERPL-MCNC: 98 MG/DL (ref 65–99)
HDLC SERPL-MCNC: 51 MG/DL
LDLC SERPL CALC-MCNC: 90 MG/DL (ref 0–100)
NONHDLC SERPL-MCNC: 117 MG/DL
POTASSIUM SERPL-SCNC: 4.2 MMOL/L (ref 3.5–5.3)
PROT SERPL-MCNC: 7.3 G/DL (ref 6.4–8.2)
SODIUM SERPL-SCNC: 139 MMOL/L (ref 136–145)
TRIGL SERPL-MCNC: 135 MG/DL

## 2022-03-23 PROCEDURE — 80061 LIPID PANEL: CPT

## 2022-03-23 PROCEDURE — 80053 COMPREHEN METABOLIC PANEL: CPT

## 2022-03-23 PROCEDURE — 36415 COLL VENOUS BLD VENIPUNCTURE: CPT

## 2022-07-04 DIAGNOSIS — Z30.41 ENCOUNTER FOR SURVEILLANCE OF CONTRACEPTIVE PILLS: ICD-10-CM

## 2022-07-05 RX ORDER — NORETHINDRONE ACETATE AND ETHINYL ESTRADIOL 5-7-9-7
KIT ORAL
Qty: 84 TABLET | Refills: 3 | Status: SHIPPED | OUTPATIENT
Start: 2022-07-05 | End: 2022-09-13 | Stop reason: SDUPTHER

## 2022-08-27 ENCOUNTER — AMB VIDEO VISIT (OUTPATIENT)
Dept: OTHER | Facility: HOSPITAL | Age: 37
End: 2022-08-27
Payer: COMMERCIAL

## 2022-08-27 DIAGNOSIS — J20.9 ACUTE BRONCHITIS, UNSPECIFIED ORGANISM: ICD-10-CM

## 2022-08-27 DIAGNOSIS — J01.40 ACUTE NON-RECURRENT PANSINUSITIS: Primary | ICD-10-CM

## 2022-08-27 PROBLEM — F41.8 SITUATIONAL ANXIETY: Status: ACTIVE | Noted: 2019-09-13

## 2022-08-27 PROBLEM — E06.3 HYPOTHYROIDISM DUE TO HASHIMOTO'S THYROIDITIS: Status: ACTIVE | Noted: 2018-12-24

## 2022-08-27 PROBLEM — J45.20 MILD INTERMITTENT ASTHMA WITHOUT COMPLICATION: Status: ACTIVE | Noted: 2022-08-27

## 2022-08-27 PROBLEM — E03.8 HYPOTHYROIDISM DUE TO HASHIMOTO'S THYROIDITIS: Status: ACTIVE | Noted: 2018-12-24

## 2022-08-27 PROBLEM — K21.9 GERD (GASTROESOPHAGEAL REFLUX DISEASE): Status: ACTIVE | Noted: 2022-08-27

## 2022-08-27 PROCEDURE — 99212 OFFICE O/P EST SF 10 MIN: CPT | Performed by: PHYSICIAN ASSISTANT

## 2022-08-27 RX ORDER — METHYLPREDNISOLONE 4 MG/1
TABLET ORAL
Qty: 21 TABLET | Refills: 0 | Status: SHIPPED | OUTPATIENT
Start: 2022-08-27

## 2022-08-27 RX ORDER — AMOXICILLIN AND CLAVULANATE POTASSIUM 875; 125 MG/1; MG/1
1 TABLET, FILM COATED ORAL 2 TIMES DAILY
Qty: 20 TABLET | Refills: 0 | Status: SHIPPED | OUTPATIENT
Start: 2022-08-27 | End: 2022-09-06

## 2022-08-27 NOTE — PATIENT INSTRUCTIONS
Acute Bronchitis   WHAT YOU NEED TO KNOW:   Acute bronchitis is swelling and irritation in your lungs  It is usually caused by a virus and most often happens in the winter  Bronchitis may also be caused by bacteria or by a chemical irritant, such as smoke  DISCHARGE INSTRUCTIONS:   Return to the emergency department if:   You cough up blood  Your lips or fingernails turn blue  You feel like you are not getting enough air when you breathe  Call your doctor if:   Your symptoms do not go away or get worse, even after treatment  Your cough does not get better within 4 weeks  You have questions or concerns about your condition or care  Medicines: You may  need any of the following:  Cough suppressants  decrease your urge to cough  Decongestants  help loosen mucus in your lungs and make it easier to cough up  This can help you breathe easier  Inhalers  may be given  Your healthcare provider may give you one or more inhalers to help you breathe easier and cough less  An inhaler gives your medicine to open your airways  Ask your healthcare provider to show you how to use your inhaler correctly  Antibiotics  may be given for up to 5 days if your bronchitis is caused by bacteria  Acetaminophen  decreases pain and fever  It is available without a doctor's order  Ask how much to take and how often to take it  Follow directions  Read the labels of all other medicines you are using to see if they also contain acetaminophen, or ask your doctor or pharmacist  Acetaminophen can cause liver damage if not taken correctly  Do not use more than 4 grams (4,000 milligrams) total of acetaminophen in one day  NSAIDs  help decrease swelling and pain or fever  This medicine is available with or without a doctor's order  NSAIDs can cause stomach bleeding or kidney problems in certain people  If you take blood thinner medicine, always ask your healthcare provider if NSAIDs are safe for you   Always read the medicine label and follow directions  Take your medicine as directed  Contact your healthcare provider if you think your medicine is not helping or if you have side effects  Tell him of her if you are allergic to any medicine  Keep a list of the medicines, vitamins, and herbs you take  Include the amounts, and when and why you take them  Bring the list or the pill bottles to follow-up visits  Carry your medicine list with you in case of an emergency  Self-care:   Drink liquids as directed  You may need to drink more liquids than usual to stay hydrated  Ask how much liquid to drink each day and which liquids are best for you  Use a cool mist humidifier  to increase air moisture in your home  This may make it easier for you to breathe and help decrease your cough  Get more rest   Rest helps your body to heal  Slowly start to do more each day  Rest when you feel it is needed  Avoid irritants in the air  Avoid chemicals, fumes, and dust  Wear a face mask if you must work around dust or fumes  Stay inside on days when air pollution levels are high  If you have allergies, stay inside when pollen counts are high  Do not use aerosol products, such as spray-on deodorant, bug spray, and hair spray  Do not smoke or be around others who are smoking  Nicotine and other chemicals in cigarettes and cigars can cause lung damage  Ask your healthcare provider for information if you currently smoke and need help to quit  E-cigarettes or smokeless tobacco still contain nicotine  Talk to your healthcare provider before you use these products  Prevent acute bronchitis:       Ask about vaccines you may need  Get a flu vaccine each year as soon as recommended, usually in September or October  Ask your healthcare provider if you should also get a pneumonia or COVID-19 vaccine  Your healthcare provider can tell you if you should also get other vaccines, and when to get them  Prevent the spread of germs    You can decrease your risk for acute bronchitis and other illnesses by doing the following:     Wash your hands often with soap and water  Carry germ-killing hand lotion or gel with you  You can use the lotion or gel to clean your hands when soap and water are not available  Do not touch your eyes, nose, or mouth unless you have washed your hands first     Always cover your mouth when you cough to prevent the spread of germs  It is best to cough into a tissue or your shirt sleeve instead of into your hand  Ask those around you to cover their mouths when they cough  Try to avoid people who have a cold or the flu  If you are sick, stay away from others as much as possible  Follow up with your doctor as directed:  Write down questions you have so you will remember to ask them during your follow-up visits  © Copyright [employer] 2022 Information is for End User's use only and may not be sold, redistributed or otherwise used for commercial purposes  All illustrations and images included in CareNotes® are the copyrighted property of [employer]  or [employer]y [employer]   The above information is an  only  It is not intended as medical advice for individual conditions or treatments  Talk to your doctor, nurse or pharmacist before following any medical regimen to see if it is safe and effective for you

## 2022-08-27 NOTE — CARE ANYWHERE EVISITS
Visit Summary for Casandra MAYS - Gender: Female - Date of Birth: [id number]  Date: [id number] - Duration: 13 minutes  Patient: Casandra SANCHEZtamekachaitanya [address]  Provider: Nya Kirk PA-C    Patient Contact Information  Address  [address] [place] [address]  [phone]    Visit Topics  Cold [Added By: Self - 2022-08-27]    Triage Questions   What is your current physical address in the event of a medical emergency? Answer []  Are you allergic to any medications? Answer []  Are you now or could you be pregnant? Answer []  Do you have any immune system compromise or chronic lung   disease? Answer []  Do you have any vulnerable family members in the home (infant, pregnant, cancer, elderly)? Answer []     Conversation Transcripts  [0A][0A] [Notification] You are connected with Nya Kirk PA-C, Urgent Care Specialist [0A][Notification] Zoraida Mendoza is located in [place]  [0A][Notification] Zoraida Mendoza has shared health history  Annalisa Rivero  [0A]    Diagnosis  Acute pansinusitis  Acute bronchitis    Procedures  Value: [id number] Code: CPT-4 UNLISTED E&M SERVICE    Medications Prescribed    No prescriptions ordered    Electronically signed by: Lucila Figueroa(NPI [id number])

## 2022-08-27 NOTE — PROGRESS NOTES
Video Visit - Jennifer Garcia 39 y o  female MRN: [id number]    REQUIRED DOCUMENTATION:         1  This service was provided via [employer] Smart[employer] Physical  2  Provider located at [address] [place] [phone]  3  [facility] provider: Robe New PA-C   4  Identify all parties in room with patient during [facility] visit:  significant other-permission granted and child(chaitanya)- permission granted  5  After connecting through [employer], patient was identified by name and date of birth  Patient was then informed that this was a Telemedicine visit and that the exam was being conducted confidentially over secure lines  My office door was closed  No one else was in the room  Patient acknowledged consent and understanding of privacy and security of the Telemedicine visit  I informed the patient that I have reviewed their record in Epic and presented the opportunity for them to ask any questions regarding the visit today  The patient agreed to participate  VITALS: Heart Rate: 80 BPM, Respiratory Rate: 20 RPM, Temperature 99 1- 100 4° F, Blood Pressure Unavailable mmHg, Pulse Ox Unavailable % on RA    HPI  Pt reports she has been sick for over a week, and feeling worse since last night  Cough became worse last night  Reports HA hurts in frontal region, teeth pain, head pressure  Tried cough syrup, tessalon, mucinex  No fevers  COVID testing has been negative  Physical Exam  Constitutional:       General: She is not in acute distress  Appearance: Normal appearance  She is normal weight  She is ill-appearing (mildly)  She is not toxic-appearing  HENT:      Head: Normocephalic and atraumatic  Nose: No rhinorrhea  Comments: Sounds very congested     Mouth/Throat:      Mouth: Mucous membranes are moist    Eyes:      Conjunctiva/sclera: Conjunctivae normal    Cardiovascular:      Rate and Rhythm: Normal rate  Pulmonary:      Effort: Pulmonary effort is normal  No respiratory distress  Breath sounds: No wheezing (no gross audible wheeze through computer)  Comments: Frequent productive cough  Musculoskeletal:      Cervical back: Normal range of motion  Skin:     Findings: No rash (on face or neck)  Neurological:      Mental Status: She is alert  Cranial Nerves: No dysarthria or facial asymmetry  Psychiatric:         Mood and Affect: Mood normal          Behavior: Behavior normal           Diagnoses and all orders for this visit:    Acute non-recurrent pansinusitis  -     Pulse Oximeter  -     amoxicillin-clavulanate (AUGMENTIN) 875-125 mg per tablet; Take 1 tablet by mouth 2 (two) times a day for 10 days  -     methylPREDNISolone 4 MG tablet therapy pack; Use as directed on package    Acute bronchitis, unspecified organism      Patient Instructions     Acute Bronchitis   WHAT YOU NEED TO KNOW:   Acute bronchitis is swelling and irritation in your lungs  It is usually caused by a virus and most often happens in the winter  Bronchitis may also be caused by bacteria or by a chemical irritant, such as smoke  DISCHARGE INSTRUCTIONS:   Return to the emergency department if:   · You cough up blood  · Your lips or fingernails turn blue  · You feel like you are not getting enough air when you breathe  Call your doctor if:   · Your symptoms do not go away or get worse, even after treatment  · Your cough does not get better within 4 weeks  · You have questions or concerns about your condition or care  Medicines: You may  need any of the following:  · Cough suppressants  decrease your urge to cough  · Decongestants  help loosen mucus in your lungs and make it easier to cough up  This can help you breathe easier  · Inhalers  may be given  Your healthcare provider may give you one or more inhalers to help you breathe easier and cough less  An inhaler gives your medicine to open your airways   Ask your healthcare provider to show you how to use your inhaler correctly  · Antibiotics  may be given for up to 5 days if your bronchitis is caused by bacteria  · Acetaminophen  decreases pain and fever  It is available without a doctor's order  Ask how much to take and how often to take it  Follow directions  Read the labels of all other medicines you are using to see if they also contain acetaminophen, or ask your doctor or pharmacist  Acetaminophen can cause liver damage if not taken correctly  Do not use more than 4 grams (4,000 milligrams) total of acetaminophen in one day  · NSAIDs  help decrease swelling and pain or fever  This medicine is available with or without a doctor's order  NSAIDs can cause stomach bleeding or kidney problems in certain people  If you take blood thinner medicine, always ask your healthcare provider if NSAIDs are safe for you  Always read the medicine label and follow directions  · Take your medicine as directed  Contact your healthcare provider if you think your medicine is not helping or if you have side effects  Tell him of her if you are allergic to any medicine  Keep a list of the medicines, vitamins, and herbs you take  Include the amounts, and when and why you take them  Bring the list or the pill bottles to follow-up visits  Carry your medicine list with you in case of an emergency  Self-care:   · Drink liquids as directed  You may need to drink more liquids than usual to stay hydrated  Ask how much liquid to drink each day and which liquids are best for you  · Use a cool mist humidifier  to increase air moisture in your home  This may make it easier for you to breathe and help decrease your cough  · Get more rest   Rest helps your body to heal  Slowly start to do more each day  Rest when you feel it is needed  · Avoid irritants in the air  Avoid chemicals, fumes, and dust  Wear a face mask if you must work around dust or fumes  Stay inside on days when air pollution levels are high   If you have allergies, stay inside when pollen counts are high  Do not use aerosol products, such as spray-on deodorant, bug spray, and hair spray  · Do not smoke or be around others who are smoking  Nicotine and other chemicals in cigarettes and cigars can cause lung damage  Ask your healthcare provider for information if you currently smoke and need help to quit  E-cigarettes or smokeless tobacco still contain nicotine  Talk to your healthcare provider before you use these products  Prevent acute bronchitis:       1  Ask about vaccines you may need  Get a flu vaccine each year as soon as recommended, usually in September or October  Ask your healthcare provider if you should also get a pneumonia or COVID-19 vaccine  Your healthcare provider can tell you if you should also get other vaccines, and when to get them  2  Prevent the spread of germs  You can decrease your risk for acute bronchitis and other illnesses by doing the following:     ? Wash your hands often with soap and water  Carry germ-killing hand lotion or gel with you  You can use the lotion or gel to clean your hands when soap and water are not available  ? Do not touch your eyes, nose, or mouth unless you have washed your hands first     ? Always cover your mouth when you cough to prevent the spread of germs  It is best to cough into a tissue or your shirt sleeve instead of into your hand  Ask those around you to cover their mouths when they cough  ? Try to avoid people who have a cold or the flu  If you are sick, stay away from others as much as possible  Follow up with your doctor as directed:  Write down questions you have so you will remember to ask them during your follow-up visits  © Copyright [employer] 2022 Information is for End User's use only and may not be sold, redistributed or otherwise used for commercial purposes   All illustrations and images included in CareNotes® are the copyrighted property of [employer]  or [address]  The above information is an  only  It is not intended as medical advice for individual conditions or treatments  Talk to your doctor, nurse or pharmacist before following any medical regimen to see if it is safe and effective for you

## 2022-09-13 ENCOUNTER — ANNUAL EXAM (OUTPATIENT)
Dept: OBGYN CLINIC | Facility: CLINIC | Age: 37
End: 2022-09-13
Payer: COMMERCIAL

## 2022-09-13 VITALS
HEIGHT: 60 IN | SYSTOLIC BLOOD PRESSURE: 120 MMHG | DIASTOLIC BLOOD PRESSURE: 82 MMHG | BODY MASS INDEX: 27.41 KG/M2 | WEIGHT: 139.6 LBS

## 2022-09-13 DIAGNOSIS — Z30.41 ENCOUNTER FOR SURVEILLANCE OF CONTRACEPTIVE PILLS: ICD-10-CM

## 2022-09-13 DIAGNOSIS — R87.612 LOW GRADE SQUAMOUS INTRAEPITHELIAL LESION (LGSIL) ON CERVICAL PAP SMEAR: ICD-10-CM

## 2022-09-13 DIAGNOSIS — Z01.419 WOMEN'S ANNUAL ROUTINE GYNECOLOGICAL EXAMINATION: Primary | ICD-10-CM

## 2022-09-13 PROCEDURE — S0612 ANNUAL GYNECOLOGICAL EXAMINA: HCPCS | Performed by: OBSTETRICS & GYNECOLOGY

## 2022-09-13 RX ORDER — NORETHINDRONE ACETATE AND ETHINYL ESTRADIOL 5-7-9-7
1 KIT ORAL DAILY
Qty: 84 TABLET | Refills: 3 | Status: SHIPPED | OUTPATIENT
Start: 2022-09-13

## 2022-09-13 RX ORDER — CETIRIZINE HYDROCHLORIDE 10 MG/1
10 TABLET ORAL DAILY
COMMUNITY

## 2022-09-13 NOTE — PROGRESS NOTES
Assessment/Plan:    Patient was informed of a stable gyn examination  She has a history of RADHA 1 out of her double cervix, for both cervix  A Pap smear was done interval GB cervix  If the Pap smear is consistent with RADHA 1 will continue yearly screening  She will be allowed to continue the birth control pill for contraception  She should return my office in 1 year  Subjective:      Patient ID: Harriet Anaya is a 40 y o  female  HPI    This is a 55-year-old white female, she is a  4 para 011  section approximately 4 years ago  Currently she is on a birth control pill for contraception  She has a history of a uterine didelphys  She has a history of RADHA 1 of both cervix  She will need to have a Pap smear today  She is content with her weight  She has a dentist on a regular basis  Birth control pills working well for contraception  She does have a history of infertility  She does have 1 embryo left for fertilization  She is unsure whether not she will do that  Does have a history of asthma is under control  She also has a history of anxiety currently taking Zoloft  She is content with her weight  She feels safe at home  Family history significant for mother with with heart disease and need a valve replacement  She is currently on Zoloft for anxiety but denies depression  She denies any problem with intimacy  She denies any major  GI complaint  The following portions of the patient's history were reviewed and updated as appropriate: allergies, current medications, past family history, past medical history, past social history, past surgical history and problem list     Review of Systems   HENT: Negative for sinus pain  All other systems reviewed and are negative  Objective:      /82   Ht 5' (1 524 m)   Wt 63 3 kg (139 lb 9 6 oz)   LMP 2022 (Exact Date)   BMI 27 26 kg/m²          Physical Exam  Vitals reviewed   Exam conducted with a chaperone present  Constitutional:       Appearance: Normal appearance  She is normal weight  HENT:      Head: Normocephalic and atraumatic  Mouth/Throat:      Mouth: Mucous membranes are moist    Eyes:      Extraocular Movements: Extraocular movements intact  Conjunctiva/sclera: Conjunctivae normal       Pupils: Pupils are equal, round, and reactive to light  Cardiovascular:      Rate and Rhythm: Normal rate and regular rhythm  Pulses: Normal pulses  Heart sounds: Normal heart sounds  Pulmonary:      Effort: Pulmonary effort is normal       Breath sounds: Normal breath sounds  Chest:   Breasts:      Right: Normal  No swelling, bleeding, inverted nipple, mass, nipple discharge, skin change, tenderness or supraclavicular adenopathy  Left: Normal  No swelling, bleeding, inverted nipple, mass, nipple discharge, skin change, tenderness or supraclavicular adenopathy  Abdominal:      General: Abdomen is flat  A surgical scar is present  Bowel sounds are normal  There is no distension  Palpations: Abdomen is soft  There is no mass  Tenderness: There is no abdominal tenderness  There is no guarding or rebound  Hernia: No hernia is present  There is no hernia in the left inguinal area or right inguinal area  Comments:  section scar x1 well-healed  Genitourinary:     General: Normal vulva  Pubic Area: No rash or pubic lice  Labia:         Right: No rash, tenderness, lesion or injury  Left: No rash, tenderness, lesion or injury  Urethra: No prolapse, urethral pain, urethral swelling or urethral lesion  Vagina: Normal  No signs of injury and foreign body  No vaginal discharge, erythema, tenderness, bleeding, lesions or prolapsed vaginal walls  Uterus: Normal        Adnexa: Right adnexa normal and left adnexa normal         Right: No mass, tenderness or fullness  Left: No mass, tenderness or fullness  Rectum: Normal       Comments: Double cervix, history of RADHA 1 bilaterally, Pap smear taken of the cervix and labeled accordingly the adnexa clear bilaterally  There is no evidence of prolapse  Urethra bladder well-supported normal position and consistency  Musculoskeletal:         General: Normal range of motion  Cervical back: Normal range of motion and neck supple  Lymphadenopathy:      Upper Body:      Right upper body: No supraclavicular adenopathy  Left upper body: No supraclavicular adenopathy  Skin:     General: Skin is warm and dry  Neurological:      General: No focal deficit present  Mental Status: She is alert and oriented to person, place, and time  Psychiatric:         Mood and Affect: Mood normal          Behavior: Behavior normal          Thought Content:  Thought content normal

## 2022-09-13 NOTE — PATIENT INSTRUCTIONS
The patient was informed of a stable gyn examination  She has a history of RADHA 1  A Pap smear was done of each individual cervix and labeled left and right  Otherwise she is doing well happy with the birth control pill a refill will be authorized for contraception

## 2022-09-19 LAB
CLINICAL INFO: NORMAL
CLINICAL INFO: NORMAL
CYTO CVX: NORMAL
CYTO CVX: NORMAL
CYTOLOGY CMNT CVX/VAG CYTO-IMP: NORMAL
CYTOLOGY CMNT CVX/VAG CYTO-IMP: NORMAL
DATE PREVIOUS BX: NORMAL
DATE PREVIOUS BX: NORMAL
HPV E6+E7 MRNA CVX QL NAA+PROBE: NOT DETECTED
HPV E6+E7 MRNA CVX QL NAA+PROBE: NOT DETECTED
LMP START DATE: NORMAL
LMP START DATE: NORMAL
SL AMB PREV. PAP:: NORMAL
SL AMB PREV. PAP:: NORMAL
SPECIMEN SOURCE CVX/VAG CYTO: NORMAL
SPECIMEN SOURCE CVX/VAG CYTO: NORMAL

## 2022-10-19 ENCOUNTER — TELEPHONE (OUTPATIENT)
Dept: OBGYN CLINIC | Facility: CLINIC | Age: 37
End: 2022-10-19

## 2022-10-19 NOTE — TELEPHONE ENCOUNTER
----- Message from [facility] sent at 10/19/2022 12:12 PM EDT -----  Regarding: Vaginal issue   Good afternoon,    I wanted to reach out and see if I need to come in or if something could be called into the pharmacy  I think I have a vaginal bacteria infection  I have some thin discharge that has an odor and experiencing some soreness and pain  It’s been a few days but doesn’t seem to be clearing up  Appreciate any guidance      Thanks,  Dario Mcbride

## 2022-10-19 NOTE — TELEPHONE ENCOUNTER
Pt started with vag discharge, yellow with fishy odor x 1 wk  Prev took Flagyl for same sx 3-4 yrs ago & had muscle cramping & had to stop taking after 2-3 days  Do you want to tx and with another med? Here for yearly 9/2022

## 2022-10-20 DIAGNOSIS — N76.0 BV (BACTERIAL VAGINOSIS): Primary | ICD-10-CM

## 2022-10-20 DIAGNOSIS — B96.89 BV (BACTERIAL VAGINOSIS): Primary | ICD-10-CM

## 2022-10-20 RX ORDER — CLINDAMYCIN PHOSPHATE 20 MG/G
CREAM VAGINAL
Qty: 40 G | Refills: 0 | Status: SHIPPED | OUTPATIENT
Start: 2022-10-20

## 2022-10-20 NOTE — PROGRESS NOTES
Patient has bacterial vaginosis on her Pap smear  She is allergic to Flagyl  Will start on Cleocin vaginal cream 1 application at night for 3 consecutive night she will keep me informed her progress

## 2022-10-20 NOTE — TELEPHONE ENCOUNTER
Patient confirmed script should be sent to [facility]  Dr Lester Crooks aware of flagyl allergy and will authorize and send different medication

## 2022-10-28 ENCOUNTER — TELEPHONE (OUTPATIENT)
Dept: OBGYN CLINIC | Facility: CLINIC | Age: 37
End: 2022-10-28

## 2022-10-28 NOTE — TELEPHONE ENCOUNTER
Pt tx with Cleocin vag cream x 3 days - completed 10/26/2022 - no noticeable vag discharge, decreased odor but still having discomfort in clitoral area, some urinary urgency, no dysuria  recom can f/u with pcp or Urgent Care since no provider in office   She will recall 10/31/2022 if still having sx

## 2023-03-05 NOTE — PATIENT INSTRUCTIONS
The patient was informed of a stable gyn examination  She does have a history of RADHA 1  A Pap smear was taken of each cervix he is a history with double cervix uterine didelphys  She will be allowed to continue the birth control pill  If the Pap smears normal return to office in 1 year  If abnormal return to office in 6 months    [facility]

## 2023-03-17 DIAGNOSIS — Z30.41 ENCOUNTER FOR SURVEILLANCE OF CONTRACEPTIVE PILLS: ICD-10-CM

## 2023-03-17 RX ORDER — NORETHINDRONE ACETATE AND ETHINYL ESTRADIOL AND FERROUS FUMARATE 5-7-9-7
1 KIT ORAL DAILY
Qty: 84 TABLET | Refills: 0 | Status: CANCELLED | OUTPATIENT
Start: 2023-03-17

## 2023-03-17 NOTE — TELEPHONE ENCOUNTER
Lm pt;s as re: has 2 remaining rfs on ocps @ [facility]'s & can request presc transfer to [facility] ([place])

## 2023-05-15 ENCOUNTER — OFFICE VISIT (OUTPATIENT)
Dept: DERMATOLOGY | Facility: CLINIC | Age: 38
End: 2023-05-15

## 2023-05-15 VITALS — WEIGHT: 138.4 LBS | BODY MASS INDEX: 27.17 KG/M2 | HEIGHT: 60 IN

## 2023-05-15 DIAGNOSIS — L23.9 ALLERGIC CONTACT DERMATITIS, UNSPECIFIED TRIGGER: Primary | ICD-10-CM

## 2023-05-15 RX ORDER — FLUCONAZOLE 150 MG/1
TABLET ORAL
COMMUNITY
Start: 2023-05-10

## 2023-05-15 RX ORDER — PREDNISONE 10 MG/1
TABLET ORAL
COMMUNITY
Start: 2023-04-28

## 2023-05-15 RX ORDER — TRIAMCINOLONE ACETONIDE 5 MG/G
CREAM TOPICAL
COMMUNITY
Start: 2023-05-10

## 2023-05-15 RX ORDER — LORAZEPAM 0.5 MG/1
TABLET ORAL
COMMUNITY
Start: 2023-04-26

## 2023-05-15 RX ORDER — AZELASTINE 1 MG/ML
SPRAY, METERED NASAL
COMMUNITY
Start: 2022-12-27

## 2023-05-15 RX ORDER — PANTOPRAZOLE SODIUM 20 MG/1
20 TABLET, DELAYED RELEASE ORAL DAILY
COMMUNITY
Start: 2023-03-22 | End: 2024-03-21

## 2023-05-15 RX ORDER — CLOBETASOL PROPIONATE 0.5 MG/G
CREAM TOPICAL
Qty: 60 G | Refills: 2 | Status: SHIPPED | OUTPATIENT
Start: 2023-05-15 | End: 2023-05-16 | Stop reason: SDUPTHER

## 2023-05-15 RX ORDER — LORAZEPAM 0.5 MG/1
0.5 TABLET ORAL 2 TIMES DAILY PRN
COMMUNITY
Start: 2023-04-26

## 2023-05-15 NOTE — PROGRESS NOTES
"[facility]     Patient Name: Keiko Child  Encounter Date: 05/15/2023     Have you been cared for by a [place] [address] Dermatologist in the last 3 years and, if so, which description applies to you? NO  I am considered a \"new\" patient and must complete all patient intake questions  I am FEMALE/of child-bearing potential     REVIEW OF SYSTEMS:  Have you recently had or currently have any of the following? · Recent fever or chills? No  · Any non-healing wound? No  · Are you pregnant or planning to become pregnant? No  · Are you currently or planning to be nursing or breast feeding? No   PAST MEDICAL HISTORY:  Have you personally ever had or currently have any of the following? If \"YES,\" then please provide more detail  · Skin cancer (such as Melanoma, Basal Cell Carcinoma, Squamous Cell Carcinoma? No  · Tuberculosis, HIV/AIDS, Hepatitis B or C: No  · Systemic Immunosuppression such as Diabetes, Biologic or Immunotherapy, Chemotherapy, Organ Transplantation, Bone Marrow Transplantation No  · Radiation Treatment No   FAMILY HISTORY:  Any \"first degree relatives\" (parent, brother, sister, or child) with the following? • Skin Cancer, Pancreatic or Other Cancer? No   PATIENT EXPERIENCE:    • Do you want the Dermatologist to perform a COMPLETE skin exam today including a clinical examination under the \"bra and underwear\" areas? NO  • If necessary, do we have your permission to call and leave a detailed message on your Preferred Phone number that includes your specific medical information?   Yes      Allergies   Allergen Reactions   • Metronidazole Myalgia     leg cramps   • Adhesive [Medical Tape] Itching and Rash   • Gluten Meal - Food Allergy GI Intolerance      Current Outpatient Medications:   •  azelastine (ASTELIN) 0 1 % nasal spray, USE 1 TO 2 SPRAYS IN EACH NOSTRIL TWICE DAILY AS DIRECTED, Disp: , Rfl:   •  cetirizine (ZyrTEC) 10 mg tablet, Take 10 mg by mouth daily, Disp: , Rfl: " •  fluconazole (DIFLUCAN) 150 mg tablet, , Disp: , Rfl:   •  LORazepam (ATIVAN) 0 5 mg tablet, TAKE 1/2 TO 1 TABLET BY MOUTH TWICE A DAY AS NEEDED FOR ANXIETY, Disp: , Rfl:   •  LORazepam (ATIVAN) 0 5 mg tablet, Take 0 5 mg by mouth 2 (two) times a day as needed, Disp: , Rfl:   •  norethindrone-ethinyl estradiol-iron (Tri-Legest Fe) 1-20/1-30/1-35 MG-MCG TABS, Take 1 tablet by mouth daily, Disp: 84 tablet, Rfl: 3  •  pantoprazole (PROTONIX) 20 mg tablet, Take 20 mg by mouth daily, Disp: , Rfl:   •  predniSONE 10 mg tablet, TAKE 1 TABLET BY MOUTH THREE TIMES DAILY FOR 3 DAYS THEN TAKE 1 TABLET BY MOUTH TWICE DAILY WITH FOOD FOR 3 DAYS, Disp: , Rfl:   •  predniSONE 10 mg tablet, 1 po tid for 3 days then 1 po bid for 3 days with food  , Disp: , Rfl:   •  sertraline (ZOLOFT) 50 mg tablet, , Disp: , Rfl:   •  triamcinolone (KENALOG) 0 5 % cream, APPLY SMALL AMOUNT TWICE DAILY FOR 14 DAYS, Disp: , Rfl:   •  triamcinolone (KENALOG) 0 5 % cream, Apply small amount bid for 14 days, Disp: , Rfl:   •  clindamycin (CLEOCIN) 2 % vaginal cream, One application in the vagina at night for 3 consecutive nights (Patient not taking: Reported on 5/3/2023), Disp: 40 g, Rfl: 0  •  ibuprofen (MOTRIN) 100 mg/5 mL suspension, Take 30 mL (600 mg total) by mouth every 6 (six) hours as needed (cramping) (Patient not taking: Reported on 9/19/2018), Disp: , Rfl: 0  •  lansoprazole (PREVACID) 30 mg capsule, Take 30 mg by mouth daily, Disp: , Rfl:   •  methylPREDNISolone 4 MG tablet therapy pack, Use as directed on package, Disp: 21 tablet, Rfl: 0  •  PROAIR  (90 Base) MCG/ACT inhaler, TAKE 1-2 PUFFS 4 TIMES A DAY AS NEEDED SHORTNESS OF BREATH (Patient not taking: Reported on 5/3/2023), Disp: 8 5 Inhaler, Rfl: 0          • Whom besides the patient is providing clinical information about today's encounter?   o NO ADDITIONAL HISTORIAN (patient alone provided history)    Physical Exam and Assessment/Plan by Diagnosis:    DERMATITIS: HYPERSENSITIVITY REACTION VS ID REACTION VS OTHER  Physical Exam:  • Anatomic Location Affected:  Hands, arms, stomach, breasts, back, legs  • Morphological Description:  Scattered 1-3 mm erythematous papules on trunk, extremities  Vesicles on the lateral fingers  • Pertinent Positives:  • Pertinent Negatives: Additional History of Present Condition:  Present for about 6-8 weeks  Patient has been on zyrtec, prednisone and triamcinolone 0 5% cream  Patient reports improvement with treatment, however returned after stopping prednisone  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 and icnreased her Zyrtec to 20 mg BID, but she cannot tolerate that dose due to sedation  Does find Zyrtec helps with itch  Patient reports rash began on stomach but scatters in location  Reports rash is very itchy  Patient denies any new medications or products  Stopped pantoprazole but that did not help  Denies rash on feet ut has some itching there  No history of skin issues  Patient is self pay  Assessment and Plan: Given that she is self pay, we will trial clobetasol BID for 2-3 weeks and she will send an update at that time  I recommended biopsy but once she reported she was self-pay, we deferred for now  If not better or worsening, will proceed with biopsy when she has insurance again June 1  Based on a thorough discussion of this condition and the management approach to it (including a comprehensive discussion of the known risks, side effects and potential benefits of treatment), the patient (family) agrees to implement the following specific plan:  • Apply Clobetasol 0 05% cream to affected areas twice daily  Avoid using on face and skin folds  • Send message via My Chart after 2-3 weeks with update  If symptoms not resolved, will perform punch biopsy          Scribe Attestation    I,:  Minnie Joel am acting as a scribe while in the presence of the attending physician :       I,:  Shital Ley MD personally performed the services described in this documentation    as scribed in my presence :

## 2023-05-15 NOTE — PATIENT INSTRUCTIONS
ALLERGIC DERMATITIS    Assessment and Plan:  Based on a thorough discussion of this condition and the management approach to it (including a comprehensive discussion of the known risks, side effects and potential benefits of treatment), the patient (family) agrees to implement the following specific plan:  Apply Clobetasol 0 05% cream to affected areas twice daily  Avoid using on face and skin folds  Send message via My Chart after 2 weeks with update  If symptoms not resolved, will perform punch biopsy

## 2023-05-16 DIAGNOSIS — L23.9 ALLERGIC CONTACT DERMATITIS, UNSPECIFIED TRIGGER: ICD-10-CM

## 2023-05-16 RX ORDER — CLOBETASOL PROPIONATE 0.5 MG/G
CREAM TOPICAL
Qty: 60 G | Refills: 2 | Status: SHIPPED | OUTPATIENT
Start: 2023-05-16

## 2023-05-16 NOTE — TELEPHONE ENCOUNTER
Spoke with the patient and she is aware that her medication has been sent to [facility]

## 2023-05-16 NOTE — TELEPHONE ENCOUNTER
Patient is calling stating she is self pay currently and the Clobetasol is too expensive at [facility] and was able to find it a good price via [facility] at [facility] in [place] and is asking for us to send the prescription there  Thank you

## 2023-07-25 ENCOUNTER — OFFICE VISIT (OUTPATIENT)
Dept: OBGYN CLINIC | Facility: CLINIC | Age: 38
End: 2023-07-25
Payer: COMMERCIAL

## 2023-07-25 VITALS
DIASTOLIC BLOOD PRESSURE: 76 MMHG | HEIGHT: 60 IN | BODY MASS INDEX: 27.29 KG/M2 | SYSTOLIC BLOOD PRESSURE: 110 MMHG | WEIGHT: 139 LBS

## 2023-07-25 DIAGNOSIS — Z30.015 ENCOUNTER FOR INITIAL PRESCRIPTION OF VAGINAL RING HORMONAL CONTRACEPTIVE: Primary | ICD-10-CM

## 2023-07-25 PROCEDURE — 99213 OFFICE O/P EST LOW 20 MIN: CPT | Performed by: OBSTETRICS & GYNECOLOGY

## 2023-07-25 RX ORDER — ETONOGESTREL AND ETHINYL ESTRADIOL 11.7; 2.7 MG/1; MG/1
INSERT, EXTENDED RELEASE VAGINAL
Qty: 3 EACH | Refills: 3 | Status: SHIPPED | OUTPATIENT
Start: 2023-07-25

## 2023-07-25 NOTE — PATIENT INSTRUCTIONS
Patient was seen today for contraception management. She is decided to go with the NuvaRing. Instruction booklet was given. She will use a backup method the first month. Return my office in September for yearly exam and reevaluation.

## 2023-07-25 NOTE — PROGRESS NOTES
1  Take Bactrim DS 1 tablet twice daily x 7 days  2  Motrin as needed for pain  3  Recommend Podiatry consult- information provided  80-year-old female  4 para 1 with 1  section almost 5 years ago. She was on birth control pills. She stopped a month ago in [place] to reconsider method of contraception. Her menstrual cycles present now. After discussion she is a started to try the NuvaRing. Prescription was given. To use a backup method the first month. She return to my office in September for yearly exam and follow-up.

## 2023-08-11 DIAGNOSIS — Z30.013 ENCOUNTER FOR INITIAL PRESCRIPTION OF INJECTABLE CONTRACEPTIVE: Primary | ICD-10-CM

## 2023-08-11 RX ORDER — MEDROXYPROGESTERONE ACETATE 150 MG/ML
150 INJECTION, SUSPENSION INTRAMUSCULAR
Qty: 1 ML | Refills: 2 | Status: SHIPPED | OUTPATIENT
Start: 2023-08-11

## 2023-08-11 NOTE — PROGRESS NOTES
To the patient this morning. She did 1 week of the NuvaRing. She had increasing cramping and discomfort and was not happy with that she stopped it earlier this week. After further discussion we have now decided to go through the Depo-Provera for contraception management. Will make arrangements for injection next week. To use a backup method for the first 3 weeks. She will make an appointment to come to my office for an injection next week the prescription have been sent to the [facility] pharmacy.

## 2023-09-14 ENCOUNTER — ANNUAL EXAM (OUTPATIENT)
Dept: OBGYN CLINIC | Facility: CLINIC | Age: 38
End: 2023-09-14
Payer: COMMERCIAL

## 2023-09-14 VITALS
SYSTOLIC BLOOD PRESSURE: 110 MMHG | WEIGHT: 138 LBS | BODY MASS INDEX: 27.09 KG/M2 | DIASTOLIC BLOOD PRESSURE: 70 MMHG | HEIGHT: 60 IN

## 2023-09-14 DIAGNOSIS — Z01.419 WOMEN'S ANNUAL ROUTINE GYNECOLOGICAL EXAMINATION: Primary | ICD-10-CM

## 2023-09-14 PROCEDURE — S0612 ANNUAL GYNECOLOGICAL EXAMINA: HCPCS | Performed by: OBSTETRICS & GYNECOLOGY

## 2023-09-14 PROCEDURE — G0145 SCR C/V CYTO,THINLAYER,RESCR: HCPCS | Performed by: OBSTETRICS & GYNECOLOGY

## 2023-09-14 PROCEDURE — G0476 HPV COMBO ASSAY CA SCREEN: HCPCS | Performed by: OBSTETRICS & GYNECOLOGY

## 2023-09-14 NOTE — PATIENT INSTRUCTIONS
The patient was informed of a stable GYN examination. She does have a double cervix and RADHA-1 of the cervix. Pap smear was performed. Her Pap smear last year was completely normal.  She will currently use condoms for contraception. She will make arrangements for vasectomy for her . She should return my office in 1 year unless new issues occur.

## 2023-09-14 NOTE — PROGRESS NOTES
Assessment/Plan:    Patient was informed of a stable GYN examination. 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 She has a history of RADHA-1 1. Pap smear was done of the cervix. To use condoms for contraception. Her  will make arrangements for a vasectomy. Her menstrual cycles are regular directable. Somewhat stressful right now dealing with her mother otherwise doing well. She does have a history of Lindy on Zoloft. She will return my office in 1 year unless new issues occur. Subjective:      Patient ID: Mo Horvath is a 45 y.o. female. HPI    This is a 35-year-old white female, she is a  4 para 1 with 1  section for delivery approximately 5 years ago. She has a history a double cervix. She has a history of RADHA-1 in both of her cervix. She has a difficult time trying aside on the method of contraception. She tried NuvaRing was not successful. She was considering a Depo-Provera but she is concerned about weight gain. She is now strongly considering a vasectomy. I strongly advised to continue using condoms to then make a decision. She is content with her weight. She feels safe at home. She does have a history of anxiety which is controlled with Zoloft. She is concerned right now because her mother is in the hospital with congestive heart failure she is 72years of age. The patient denies any major  or GI complaint. The following portions of the patient's history were reviewed and updated as appropriate: allergies, current medications, past family history, past medical history, past social history, past surgical history and problem list.    Review of Systems   HENT: Negative for sinus pain. All other systems reviewed and are negative. Objective:      /70   Ht 5' (1.524 m)   Wt 62.6 kg (138 lb)   LMP 2023 (Exact Date)   BMI 26.95 kg/m²          Physical Exam  Vitals reviewed. Exam conducted with a chaperone present.    Constitutional: Appearance: Normal appearance. She is normal weight. HENT:      Head: Normocephalic and atraumatic. Nose: Nose normal.      Mouth/Throat:      Mouth: Mucous membranes are moist.   Eyes:      Extraocular Movements: Extraocular movements intact. Pupils: Pupils are equal, round, and reactive to light. Cardiovascular:      Rate and Rhythm: Normal rate and regular rhythm. Pulses: Normal pulses. Heart sounds: Normal heart sounds. Pulmonary:      Effort: Pulmonary effort is normal.      Breath sounds: Normal breath sounds. Chest:   Breasts:     Breasts are symmetrical.      Right: Normal. No swelling, bleeding, inverted nipple, mass, nipple discharge, skin change or tenderness. Left: Normal. No swelling, bleeding, inverted nipple, mass, nipple discharge, skin change or tenderness. Abdominal:      General: Abdomen is flat. A surgical scar is present. Bowel sounds are normal. There is no distension. Palpations: Abdomen is soft. There is no mass. Tenderness: There is no abdominal tenderness. There is no guarding or rebound. Hernia: No hernia is present. There is no hernia in the left inguinal area or right inguinal area. Comments:  section scar well-healed x1   Genitourinary:     General: Normal vulva. Labia:         Right: No rash, tenderness or lesion. Left: No rash, tenderness, lesion or injury. Urethra: No prolapse, urethral pain, urethral swelling or urethral lesion. Rectum: Normal.      Comments: Double cervix, a separate Pap smear was taken for each 1. Uterus is anterior normal size. The adnexa clear bilaterally. There is no evidence of prolapse. There is no cervical motion tenderness. The vagina and urethra are normal working relationship. Musculoskeletal:         General: Normal range of motion. Cervical back: Normal range of motion and neck supple.    Lymphadenopathy:      Upper Body:      Right upper body: No supraclavicular or axillary adenopathy. Left upper body: No supraclavicular or axillary adenopathy. Lower Body: No right inguinal adenopathy. No left inguinal adenopathy. Skin:     General: Skin is warm and dry. Neurological:      General: No focal deficit present. Mental Status: She is alert and oriented to person, place, and time. Psychiatric:         Mood and Affect: Mood normal.         Behavior: Behavior normal.         Thought Content:  Thought content normal.

## 2023-09-16 LAB
HPV HR 12 DNA CVX QL NAA+PROBE: NEGATIVE
HPV16 DNA CVX QL NAA+PROBE: NEGATIVE
HPV18 DNA CVX QL NAA+PROBE: NEGATIVE

## 2023-09-20 LAB
LAB AP GYN PRIMARY INTERPRETATION: NORMAL
LAB AP GYN PRIMARY INTERPRETATION: NORMAL
Lab: NORMAL
Lab: NORMAL

## 2024-03-17 NOTE — PATIENT INSTRUCTIONS
The patient is informed of a stable gyn examination  She has a history of RADHA 1  A Pap smear the double cervix was taken  She will be allowed to continue birth control pill for contraception  Return my office in 1 year  show

## 2024-09-16 ENCOUNTER — ANNUAL EXAM (OUTPATIENT)
Dept: OBGYN CLINIC | Facility: CLINIC | Age: 39
End: 2024-09-16
Payer: COMMERCIAL

## 2024-09-16 VITALS — SYSTOLIC BLOOD PRESSURE: 124 MMHG | DIASTOLIC BLOOD PRESSURE: 78 MMHG | WEIGHT: 140 LBS | BODY MASS INDEX: 27.34 KG/M2

## 2024-09-16 DIAGNOSIS — Z30.011 ENCOUNTER FOR INITIAL PRESCRIPTION OF CONTRACEPTIVE PILLS: ICD-10-CM

## 2024-09-16 DIAGNOSIS — Z01.419 WOMEN'S ANNUAL ROUTINE GYNECOLOGICAL EXAMINATION: Primary | ICD-10-CM

## 2024-09-16 PROCEDURE — S0612 ANNUAL GYNECOLOGICAL EXAMINA: HCPCS | Performed by: OBSTETRICS & GYNECOLOGY

## 2024-09-16 RX ORDER — NORGESTIMATE AND ETHINYL ESTRADIOL 0.25-0.035
1 KIT ORAL DAILY
Qty: 84 TABLET | Refills: 3 | Status: SHIPPED | OUTPATIENT
Start: 2024-09-16

## 2024-09-16 NOTE — PATIENT INSTRUCTIONS
Patient was informed of a stable GYN examination tissue she does have a history of RADHA-1 in the past.  She is a double cervix.  A Pap smear of cervix was performed.  She will also start on birth control pill for cycle control and contraception and dysmenorrhea she will keep me informed of her progress.  She should return to my office in 1 year unless new issues or problems occur.  She will continue using her Zoloft for an anxiety.

## 2024-09-16 NOTE — PROGRESS NOTES
Ambulatory Visit  Name: Radha Robles      : 1985      MRN: [id number]  Encounter Provider: Garry Pinedo MD  Encounter Date: 2024   Encounter department: OB GYN [facility]    Assessment & Plan  Women's annual routine gynecological examination    Orders:    Thinprep Tis Pap Reflex HPV mRNA E6/E7    Thinprep Tis Pap Reflex HPV mRNA E6/E7    Encounter for initial prescription of contraceptive pills  The patient was informed of a stable GYN examination.  A Pap smear was done a visa for a double cervix.  This is normal size I was unable to reproduce any kind of pain.  We have now decided put her back on the birth control pill for cycle control and dysmenorrhea.  She will keep me informed of her progress.  She feels safe at home.  She is happy with her weight.  She is doing well with the Zoloft for anxiety she will keep me informed.  She should return to my office in 1 year.  If she is not happy with the birth control pill she will let me know.  Orders:    norgestimate-ethinyl estradiol (Sprintec 28) 0.25-35 MG-MCG per tablet; Take 1 tablet by mouth daily  The patient was informed of a stable GYN examination.  A Pap smear was done for each of her cervix labeled left and right.  We are restarting the birth control pill for contraception and for control of her pelvic pain and backache.  She will keep me informed if on her pain does pain does not get better we need to be further investigated.  She is content with her weight.  She feels safe at home.  She sees a dentist on a regular basis.  She does have a history of anxiety and depression which is controlled with Zoloft.  There are no new major family illnesses reported.  Her mother still has cardiac issues to report.    History of Present Illness     Radha Robles is a 39 y.o. female who presents for her annual GYN examination.  She is a  4 para 1 with 1  section for delivery approximately 6 years ago.  She has a history of a  double cervix.  She has a history of RADHA-1 in both show cervi.  Her last year's Pap smear was normal and both cervi.  She stopped the birth control pill for contraception she was not feeling right.  Now she is complaining recent backache and discomfort when she gets her periods.  She is contemplating going back on the birth control pills will give her time to make a decision.  Her  may schedule a vasectomy but if she goes back on the birth control pill he may ask for the leg.  She is content with her weight.  She feels safe at home.  She sees a dentist on a regular basis.  She does have a history of anxiety which is controlled with Zoloft and doing well with that.  There are no new major family illnesses to report.  There is no major  or GI complaint.  There is no problem with intimacy.  She will need a Pap smear today.      Review of Systems   All other systems reviewed and are negative.          Objective     /78   Wt 63.5 kg (140 lb)   LMP 09/08/2024 (Approximate)   BMI 27.34 kg/m²     Physical Exam  Vitals reviewed. Exam conducted with a chaperone present.   Constitutional:       Appearance: Normal appearance.   HENT:      Head: Normocephalic and atraumatic.      Nose: Nose normal.      Mouth/Throat:      Mouth: Mucous membranes are moist.   Eyes:      Extraocular Movements: Extraocular movements intact.      Pupils: Pupils are equal, round, and reactive to light.   Cardiovascular:      Rate and Rhythm: Normal rate and regular rhythm.      Pulses: Normal pulses.      Heart sounds: Normal heart sounds.   Pulmonary:      Effort: Pulmonary effort is normal.      Breath sounds: Normal breath sounds.   Chest:   Breasts:     Breasts are symmetrical.      Right: Normal.      Left: Normal.   Abdominal:      General: Abdomen is flat. A surgical scar is present. Bowel sounds are normal. There is no distension.      Palpations: Abdomen is soft. There is no hepatomegaly, splenomegaly or mass.       Tenderness: There is no abdominal tenderness.      Hernia: No hernia is present. There is no hernia in the left inguinal area or right inguinal area.          Comments:  section scar well-healed x 1   Genitourinary:     General: Normal vulva.      Pubic Area: No rash or pubic lice.       Labia:         Right: No rash, tenderness, lesion or injury.         Left: No rash, tenderness, lesion or injury.       Urethra: No prolapse, urethral pain, urethral swelling or urethral lesion.      Vagina: Normal. No signs of injury and foreign body. No vaginal discharge, erythema, tenderness, bleeding, lesions or prolapsed vaginal walls.      Uterus: Normal.       Adnexa: Right adnexa normal and left adnexa normal.      Rectum: Normal.      Comments: The external genitalia are within her normal limits the vagina is clean some early menses are still present.  The double cervix was still present.  We did a Pap smear and cervix labeled left and right.  The uterus is normal size.  There is no cervical motion tenderness.  I was unable to reproduce any kind of pain or discomfort.  Adnexa clear bilaterally.  There is no evidence of prolapse.  Urethra and bladder normal working relationship.  Musculoskeletal:         General: Normal range of motion.      Cervical back: Normal range of motion and neck supple.   Lymphadenopathy:      Upper Body:      Right upper body: No supraclavicular or axillary adenopathy.      Left upper body: No supraclavicular or axillary adenopathy.   Skin:     General: Skin is warm.   Neurological:      General: No focal deficit present.      Mental Status: She is alert and oriented to person, place, and time.   Psychiatric:         Mood and Affect: Mood normal.         Behavior: Behavior normal.         Thought Content: Thought content normal.

## 2024-09-20 LAB
CLINICAL INFO: NORMAL
CYTO CVX: NORMAL
CYTOLOGY CMNT CVX/VAG CYTO-IMP: NORMAL
DATE PREVIOUS BX: NORMAL
LMP START DATE: NORMAL
SL AMB PREV. PAP:: NORMAL
SPECIMEN SOURCE CVX/VAG CYTO: NORMAL

## 2024-12-17 ENCOUNTER — TELEPHONE (OUTPATIENT)
Dept: DERMATOLOGY | Facility: CLINIC | Age: 39
End: 2024-12-17

## 2024-12-17 NOTE — TELEPHONE ENCOUNTER
LMOM that 02/03/2025 appt w/Dr Jett was cx & r/s, due to a change in her schedule she's no longer going to the [place] office, to please give the office a call to r/s.

## (undated) DEVICE — SUT VICRYL 0 CTX 36 IN J978H

## (undated) DEVICE — CHLORAPREP HI-LITE 26ML ORANGE

## (undated) DEVICE — TELFA NON-ADHERENT ABSORBENT DRESSING: Brand: TELFA

## (undated) DEVICE — GAUZE SPONGES,16 PLY: Brand: CURITY

## (undated) DEVICE — ABDOMINAL PAD: Brand: DERMACEA

## (undated) DEVICE — ADHESIVE SKN CLSR HISTOACRYL FLEX 0.5ML LF

## (undated) DEVICE — PACK C-SECTION PBDS

## (undated) DEVICE — GLOVE SRG BIOGEL 7.5

## (undated) DEVICE — SUT VICRYL 0 CT-1 27 IN J260H

## (undated) DEVICE — SUT MONOCRYL 4-0 PS-2 27 IN Y426H

## (undated) DEVICE — SKIN MARKER DUAL TIP WITH RULER CAP, FLEXIBLE RULER AND LABELS: Brand: DEVON

## (undated) DEVICE — SUT PLAIN 3-0 CTX 27 IN 873H

## (undated) DEVICE — Device

## (undated) DEVICE — GLOVE SRG BIOGEL 7